# Patient Record
Sex: FEMALE | Race: WHITE | Employment: OTHER | URBAN - METROPOLITAN AREA
[De-identification: names, ages, dates, MRNs, and addresses within clinical notes are randomized per-mention and may not be internally consistent; named-entity substitution may affect disease eponyms.]

---

## 2017-01-17 ENCOUNTER — NURSING HOME VISIT (OUTPATIENT)
Dept: GERIATRICS | Facility: CLINIC | Age: 82
End: 2017-01-17
Payer: COMMERCIAL

## 2017-01-17 DIAGNOSIS — F02.818 DEMENTIA IN CONDITIONS CLASSIFIED ELSEWHERE WITH AGGRESSIVE BEHAVIOR (H): Primary | ICD-10-CM

## 2017-01-17 DIAGNOSIS — F41.9 ANXIETY: ICD-10-CM

## 2017-01-17 DIAGNOSIS — M17.0 PRIMARY OSTEOARTHRITIS OF BOTH KNEES: ICD-10-CM

## 2017-01-17 PROCEDURE — 99207 ZZC CDG-CORRECTLY CODED, REVIEWED AND AGREE: CPT | Performed by: NURSE PRACTITIONER

## 2017-01-17 PROCEDURE — 99308 SBSQ NF CARE LOW MDM 20: CPT | Performed by: NURSE PRACTITIONER
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
## 2017-01-19 VITALS
BODY MASS INDEX: 33.92 KG/M2 | HEIGHT: 65 IN | DIASTOLIC BLOOD PRESSURE: 64 MMHG | WEIGHT: 203.6 LBS | OXYGEN SATURATION: 90 % | RESPIRATION RATE: 16 BRPM | HEART RATE: 67 BPM | TEMPERATURE: 97.2 F | SYSTOLIC BLOOD PRESSURE: 129 MMHG

## 2017-02-21 ENCOUNTER — NURSING HOME VISIT (OUTPATIENT)
Dept: GERIATRICS | Facility: CLINIC | Age: 82
End: 2017-02-21
Payer: COMMERCIAL

## 2017-02-21 VITALS
TEMPERATURE: 98.7 F | OXYGEN SATURATION: 90 % | HEART RATE: 73 BPM | RESPIRATION RATE: 18 BRPM | BODY MASS INDEX: 34 KG/M2 | SYSTOLIC BLOOD PRESSURE: 137 MMHG | DIASTOLIC BLOOD PRESSURE: 57 MMHG | WEIGHT: 204.3 LBS

## 2017-02-21 DIAGNOSIS — I10 HYPERTENSION, BENIGN ESSENTIAL, GOAL BELOW 140/90: Primary | ICD-10-CM

## 2017-02-21 DIAGNOSIS — E03.4 HYPOTHYROIDISM DUE TO ACQUIRED ATROPHY OF THYROID: ICD-10-CM

## 2017-02-21 DIAGNOSIS — F02.818 DEMENTIA IN CONDITIONS CLASSIFIED ELSEWHERE WITH AGGRESSIVE BEHAVIOR (H): ICD-10-CM

## 2017-02-21 PROCEDURE — 99308 SBSQ NF CARE LOW MDM 20: CPT | Performed by: NURSE PRACTITIONER
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
## 2017-03-07 ENCOUNTER — NURSING HOME VISIT (OUTPATIENT)
Dept: GERIATRICS | Facility: CLINIC | Age: 82
End: 2017-03-07
Payer: COMMERCIAL

## 2017-03-07 VITALS
WEIGHT: 203.6 LBS | SYSTOLIC BLOOD PRESSURE: 99 MMHG | OXYGEN SATURATION: 96 % | TEMPERATURE: 98.6 F | RESPIRATION RATE: 20 BRPM | DIASTOLIC BLOOD PRESSURE: 43 MMHG | HEART RATE: 70 BPM | BODY MASS INDEX: 33.88 KG/M2

## 2017-03-07 DIAGNOSIS — M17.0 PRIMARY OSTEOARTHRITIS OF BOTH KNEES: ICD-10-CM

## 2017-03-07 DIAGNOSIS — I10 HYPERTENSION, BENIGN ESSENTIAL, GOAL BELOW 140/90: Primary | ICD-10-CM

## 2017-03-07 DIAGNOSIS — R51.9 HEADACHE, UNSPECIFIED HEADACHE TYPE: ICD-10-CM

## 2017-03-07 PROCEDURE — 99308 SBSQ NF CARE LOW MDM 20: CPT | Performed by: NURSE PRACTITIONER

## 2017-03-07 PROCEDURE — 99207 ZZC CDG-CORRECTLY CODED, REVIEWED AND AGREE: CPT | Performed by: NURSE PRACTITIONER
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
## 2017-03-16 ENCOUNTER — HOSPITAL LABORATORY (OUTPATIENT)
Facility: OTHER | Age: 82
End: 2017-03-16

## 2017-03-16 LAB — TSH SERPL DL<=0.05 MIU/L-ACNC: 4.24 MU/L (ref 0.4–4)

## 2017-04-11 VITALS
BODY MASS INDEX: 33.98 KG/M2 | DIASTOLIC BLOOD PRESSURE: 95 MMHG | TEMPERATURE: 98.9 F | HEART RATE: 85 BPM | OXYGEN SATURATION: 94 % | WEIGHT: 204.2 LBS | SYSTOLIC BLOOD PRESSURE: 140 MMHG

## 2017-04-11 NOTE — PROGRESS NOTES
[facility]    Chief Complaint   Patient presents with     halfway Regulatory       HPI:  Obtained from the medical record and from the medial staffs  Nguyen Borja is a 83 year old  (1/11/1934), who is being seen today for a federally mandated E/M visit at [facility] [place]. Today's concerns are:     Hypertension, benign essential, goal below 140/90  - no chest discomfort or SOB noted by nursing staff.      Hypothyroidism due to acquired atrophy of thyroid  - Non verbal, bed ridden, mood flat.    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  - Bedridden and non verbal.  - Severe, and is unable to make needs known. 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 On norco scheduled and prn.         Past Medical, social, family histories, medications, and allergies reviewed and updated    MEDICATIONS:  Current Outpatient Prescriptions   Medication Sig Dispense Refill     Acetaminophen (TYLENOL PO) Take 650 mg by mouth every 4 hours as needed for mild pain or fever       chlorhexidine (PERIDEX) 0.12 % solution Take by mouth 2 times daily One oral strip by mouth two times a day for gingivitis.  Swab mouth/gums BID after oral cares.       nystatin (MYCOSTATIN) 144086 UNIT/GM POWD Apply topically daily as needed       levothyroxine (SYNTHROID) 75 MCG tablet Take 75 mcg by mouth every morning       HYDROcodone-acetaminophen (NORCO) 5-325 MG per tablet Take 1 tablet by mouth every 8 hours And 1 tablet every 4hours PRN       Medications reviewed: Reviewed in the chart and EHR.        Case Management:  I have reviewed the care plan and MDS and do agree with the plan. Patient's desire to return to the community is not present.  Information reviewed:  Medications, vital signs, orders, and nursing  notes.    ROS:  Unobtainable secondary to cognitive impairment or aphasia.    Exam:  BP (!) 140/95  Pulse 85  Temp 98.9  F (37.2  C)  Wt 204 lb 3.2 oz (92.6 kg)  SpO2 94%  BMI 33.98 kg/m2  GENERAL APPEARANCE:  opens eye for verbal stimuli,   ENT:  Mouth normal, dry oral mucous membranes  EYES:  EOM, conjunctivae, lids, pupils and irises normal  NECK:  No adenopathy,masses or thyromegaly  RESP:  respiratory effort and palpation of chest normal, lungs clear to auscultation   CV:  Palpation and auscultation of heart done , regular rate and rhythm, no murmur, rub, or gallop, no edema  ABDOMEN:  normal bowel sounds, soft, nontender, no hepatosplenomegaly or other masses  M/S:   bed bound, extremities contractures. Legs with AFO brace.   SKIN:  Inspection of skin and subcutaneous tissue baseline  NEURO:   non verbal. Moves eyes.   PSYCH:  tired looking. no communications    Lab/Diagnostic data:    Hospital Laboratory on 03/16/2017   Component Date Value Ref Range Status     TSH 03/16/2017 4.24* 0.40 - 4.00 mU/L Final         ASSESSMENT/PLAN  Hypertension, benign essential, goal below 140/90   BP Readings from Last 3 Encounters:   04/11/17 (!) 140/95   03/07/17 99/43   02/21/17 137/57   - not on meds.     Hypothyroidism due to acquired atrophy of thyroid  Not on meds. Stable.     Frailty  - Significant  Deficits requiring NH placement. Requiring extensive assistance from nursing. Up for meals only o/w spends the day resting in bed      Generalized pain  - continue norco.     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 Chronic condition, ongoing decline expected.   -  Continue to provide redirection and reassurance as needed. Maintain safe living situation with goals focused on comfort.        Orders:  - The current medical regimen is effective;  continue present plan and medications.      Total time spent with patient visit was 35 min including patient visit  and review of past records.  .    Electronically signed by:  Jhony Shields MD

## 2017-04-12 ENCOUNTER — NURSING HOME VISIT (OUTPATIENT)
Dept: GERIATRICS | Facility: CLINIC | Age: 82
End: 2017-04-12
Payer: COMMERCIAL

## 2017-04-12 DIAGNOSIS — R52 GENERALIZED PAIN: ICD-10-CM

## 2017-04-12 DIAGNOSIS — I10 HYPERTENSION, BENIGN ESSENTIAL, GOAL BELOW 140/90: Primary | ICD-10-CM

## 2017-04-12 DIAGNOSIS — F02.818 DEMENTIA IN CONDITIONS CLASSIFIED ELSEWHERE WITH AGGRESSIVE BEHAVIOR (H): ICD-10-CM

## 2017-04-12 DIAGNOSIS — R54 FRAILTY: ICD-10-CM

## 2017-04-12 DIAGNOSIS — E03.4 HYPOTHYROIDISM DUE TO ACQUIRED ATROPHY OF THYROID: ICD-10-CM

## 2017-04-12 PROCEDURE — 99310 SBSQ NF CARE HIGH MDM 45: CPT | Performed by: FAMILY MEDICINE

## 2017-04-12 PROCEDURE — 99207 ZZC CDG-CORRECTLY CODED, REVIEWED AND AGREE: CPT | Performed by: FAMILY MEDICINE

## 2017-05-09 VITALS
HEART RATE: 79 BPM | OXYGEN SATURATION: 94 % | WEIGHT: 203.8 LBS | DIASTOLIC BLOOD PRESSURE: 68 MMHG | TEMPERATURE: 96.6 F | BODY MASS INDEX: 33.91 KG/M2 | SYSTOLIC BLOOD PRESSURE: 136 MMHG

## 2017-05-09 NOTE — PROGRESS NOTES
[facility]    Chief Complaint   Patient presents with     Nursing Home Acute       HPI:    Nguyen Borja is a 83 year old  (1/11/1934), who is being seen today for an episodic care visit at [facility] [place]. Today's concern is:  Late onset Alzheimer's disease without behavioral disturbance  Unable to speak, advanced dementia    Hypothyroidism due to acquired atrophy of thyroid  No new symptoms.     Generalized pain  No observable pain.       ALLERGIES: Review of patient's allergies indicates no known allergies.  Past Medical, Surgical, Family and Social History reviewed and updated in [facility].    Current Outpatient Prescriptions   Medication Sig Dispense Refill     Acetaminophen (TYLENOL PO) Take 650 mg by mouth every 4 hours as needed for mild pain or fever       chlorhexidine (PERIDEX) 0.12 % solution Take by mouth 2 times daily One oral strip by mouth two times a day for gingivitis.  Swab mouth/gums BID after oral cares.       nystatin (MYCOSTATIN) 750930 UNIT/GM POWD Apply topically daily as needed       levothyroxine (SYNTHROID) 75 MCG tablet Take 75 mcg by mouth every morning       HYDROcodone-acetaminophen (NORCO) 5-325 MG per tablet Take 1 tablet by mouth every 8 hours And 1 tablet every 4hours PRN         Medications reconciled to facility chart and changes were made to reflect current medications as identified as above med list. Below are the changes that were made:   Medications stopped since last EPIC medication reconciliation:   There are no discontinued medications.    Medications started since last [facility] medication reconciliation:  No orders of the defined types were placed in this encounter.    REVIEW OF SYSTEMS:  Unobtainable secondary to cognitive impairment or aphasia.    PHYSICAL EXAM:  /68  Pulse 79  Temp 96.6  F (35.9  C)  Wt 203 lb 12.8 oz (92.4 kg)  SpO2 94%  BMI 33.91 kg/m2  GENERAL APPEARANCE:  Alert, in no distress  HEAD:  Normal, normocephalic,  atraumatic  CHEST/RESP:  respiratory effort normal, lung sounds CTA , no respiratory distress  CV:  Rate reg, rhythm reg, no murmur, no peripheral edema   PSYCH:  Alert and unable to determine orientation due to cognitive impairment     RECENT LABS:    Hospital Laboratory on 03/16/2017   Component Date Value Ref Range Status     TSH 03/16/2017 4.24* 0.40 - 4.00 mU/L Final       The health plan new enrollment has happened. I have reviewed the  MDS, the preventative needs,  and facility care plan. The level of care is appropriate. I have reviewed the code status/advanced directives.      ASSESSMENT/PLAN:  Late onset Alzheimer's disease without behavioral disturbance  Chronic condition, ongoing decline expected. Maintain safe living situation with goals focused on comfort.      Hypothyroidism due to acquired atrophy of thyroid  TSH normal. The current medical regimen is effective;  continue present plan and medications.      Generalized pain  Controlled, stable. The current medical regimen is effective;  continue present plan and medications.          The health plan new enrollment has happened. I have reviewed the  MDS, the preventative needs,  and facility care plan. The level of care is appropriate. I have reviewed the code status/advanced directives.       ORDERS:  No new orders     Total time spent with patient visit was 15 min including patient visit and review of past records   Greater than 50% of total time spent with counseling and coordinating care    Electronically signed by  Jennifer Serrano CNP   [facility]  [phone] cell

## 2017-05-10 ENCOUNTER — NURSING HOME VISIT (OUTPATIENT)
Dept: GERIATRICS | Facility: CLINIC | Age: 82
End: 2017-05-10
Payer: COMMERCIAL

## 2017-05-10 DIAGNOSIS — F02.80 LATE ONSET ALZHEIMER'S DISEASE WITHOUT BEHAVIORAL DISTURBANCE (H): Primary | ICD-10-CM

## 2017-05-10 DIAGNOSIS — E03.4 HYPOTHYROIDISM DUE TO ACQUIRED ATROPHY OF THYROID: ICD-10-CM

## 2017-05-10 DIAGNOSIS — G30.1 LATE ONSET ALZHEIMER'S DISEASE WITHOUT BEHAVIORAL DISTURBANCE (H): Primary | ICD-10-CM

## 2017-05-10 DIAGNOSIS — R52 GENERALIZED PAIN: ICD-10-CM

## 2017-05-10 PROCEDURE — 99308 SBSQ NF CARE LOW MDM 20: CPT | Performed by: NURSE PRACTITIONER

## 2017-05-10 PROCEDURE — 99207 ZZC CDG-CORRECTLY CODED, REVIEWED AND AGREE: CPT | Performed by: NURSE PRACTITIONER

## 2017-06-07 ENCOUNTER — NURSING HOME VISIT (OUTPATIENT)
Dept: GERIATRICS | Facility: CLINIC | Age: 82
End: 2017-06-07
Payer: COMMERCIAL

## 2017-06-07 VITALS
WEIGHT: 204 LBS | RESPIRATION RATE: 18 BRPM | TEMPERATURE: 98.2 F | OXYGEN SATURATION: 92 % | SYSTOLIC BLOOD PRESSURE: 128 MMHG | BODY MASS INDEX: 33.95 KG/M2 | DIASTOLIC BLOOD PRESSURE: 58 MMHG | HEART RATE: 70 BPM

## 2017-06-07 DIAGNOSIS — E03.4 HYPOTHYROIDISM DUE TO ACQUIRED ATROPHY OF THYROID: ICD-10-CM

## 2017-06-07 DIAGNOSIS — R52 GENERALIZED PAIN: ICD-10-CM

## 2017-06-07 DIAGNOSIS — G30.1 LATE ONSET ALZHEIMER'S DISEASE WITHOUT BEHAVIORAL DISTURBANCE (H): Primary | ICD-10-CM

## 2017-06-07 DIAGNOSIS — F02.80 LATE ONSET ALZHEIMER'S DISEASE WITHOUT BEHAVIORAL DISTURBANCE (H): Primary | ICD-10-CM

## 2017-06-07 DIAGNOSIS — I10 HYPERTENSION, BENIGN ESSENTIAL, GOAL BELOW 140/90: ICD-10-CM

## 2017-06-07 PROCEDURE — 99308 SBSQ NF CARE LOW MDM 20: CPT | Performed by: NURSE PRACTITIONER

## 2017-06-07 PROCEDURE — 99207 ZZC CDG-CORRECTLY CODED, REVIEWED AND AGREE: CPT | Performed by: NURSE PRACTITIONER

## 2017-06-07 NOTE — PROGRESS NOTES
[facility]    Chief Complaint   Patient presents with     custodial Regulatory       HPI:    Nguyen Borja is a 83 year old  (1/11/1934), who is being seen today for a federally mandated E/M visit at [facility].  HPI information obtained from: facility chart records and facility staff. Today's concerns are:  Late onset Alzheimer's disease without behavioral disturbance  Advanced dementia.  No reported changes.  Unable to speak    Hypothyroidism due to acquired atrophy of thyroid  Last TSH WNL. No new S/S    Hypertension, benign essential, goal below 140/90  One reading over 140/90.  All others WNL    Generalized pain  No reported or observable pain      ALLERGIES: Review of patient's allergies indicates no known allergies.  New new History to report    Current Outpatient Prescriptions   Medication Sig Dispense Refill     chlorhexidine (PERIDEX) 0.12 % solution Take by mouth 2 times daily One oral strip by mouth two times a day for gingivitis.  Swab mouth/gums BID after oral cares.       nystatin (MYCOSTATIN) 308497 UNIT/GM POWD Apply topically daily as needed       levothyroxine (SYNTHROID) 75 MCG tablet Take 75 mcg by mouth every morning       HYDROcodone-acetaminophen (NORCO) 5-325 MG per tablet Take 1 tablet by mouth every 8 hours And 1 tablet every 4hours PRN       Medications reviewed:  Medications reconciled to facility chart and changes were made to reflect current medications as identified as above med list. Below are the changes that were made:   Medications stopped since last EPIC medication reconciliation:   Medications Discontinued During This Encounter   Medication Reason     Acetaminophen (TYLENOL PO)        Medications started since last [facility] medication reconciliation:  No orders of the defined types were placed in this encounter.        Case Management:  I have reviewed the care plan and MDS and do agree with the plan. Patient's desire to return to the community  is not present.  Information reviewed:  Medications, vital signs, orders, and nursing notes.    ROS:  Unobtainable secondary to cognitive impairment or aphasia.    Exam:  Vitals: /58  Pulse 70  Temp 98.2  F (36.8  C)  Resp 18  Wt 204 lb (92.5 kg)  SpO2 92%  BMI 33.95 kg/m2  BMI= Body mass index is 33.95 kg/(m^2).    GENERAL APPEARANCE:  Semi-Alert, makes eye contact, no intelligible words, in no apparent distress  Oral: gums red,not tender to touch  RESP:  respiratory effort and palpation of chest normal, auscultation of lungs CTA , no respiratory distress  CV:  Palpation and auscultation of heart done , rate and rhythm regualr, no peripheral edema  ABDOMEN:  normal bowel sounds, soft, nontender,   SKIN:  Mild redness under bilateral breasts   PSYCH:  Unable to determine orientation due to cognitive impairment      Lab/Diagnostic data:    Results for orders placed or performed in visit on 03/16/17   TSH   Result Value Ref Range    TSH 4.24 (H) 0.40 - 4.00 mU/L         ASSESSMENT/PLAN  Late onset Alzheimer's disease without behavioral disturbance  Chronic, ongoing decline expected.  Continue current POC.  Focus is comfort    Hypothyroidism due to acquired atrophy of thyroid  TSH normal.  Continue current medical regimen    Hypertension, benign essential, goal below 140/90  Stable.  Continue current POC    Generalized pain  Controlled,The current medical regimen is effective;  continue present plan and medications.        Total time spent with patient visit at the skilled nursing facility was 15 min including patient visit and review of past records. Greater than 50% of total time spent with counseling and coordinating care due to x alzheimer's    Electronically signed by:  TONE Worthy CNP

## 2017-06-26 ENCOUNTER — HOSPITAL ENCOUNTER (INPATIENT)
Facility: CLINIC | Age: 82
LOS: 2 days | Discharge: HOSPICE | DRG: 871 | End: 2017-06-28
Attending: STUDENT IN AN ORGANIZED HEALTH CARE EDUCATION/TRAINING PROGRAM | Admitting: INTERNAL MEDICINE
Payer: COMMERCIAL

## 2017-06-26 ENCOUNTER — MEDICAL CORRESPONDENCE (OUTPATIENT)
Dept: HEALTH INFORMATION MANAGEMENT | Facility: CLINIC | Age: 82
End: 2017-06-26

## 2017-06-26 ENCOUNTER — APPOINTMENT (OUTPATIENT)
Dept: GENERAL RADIOLOGY | Facility: CLINIC | Age: 82
DRG: 871 | End: 2017-06-26
Attending: STUDENT IN AN ORGANIZED HEALTH CARE EDUCATION/TRAINING PROGRAM
Payer: COMMERCIAL

## 2017-06-26 DIAGNOSIS — N39.0 URINARY TRACT INFECTION, SITE UNSPECIFIED: ICD-10-CM

## 2017-06-26 DIAGNOSIS — J18.9 CAP (COMMUNITY ACQUIRED PNEUMONIA): Primary | ICD-10-CM

## 2017-06-26 DIAGNOSIS — J18.9 PNEUMONIA OF LEFT LOWER LOBE DUE TO INFECTIOUS ORGANISM: ICD-10-CM

## 2017-06-26 DIAGNOSIS — J96.01 ACUTE RESPIRATORY FAILURE WITH HYPOXIA (H): ICD-10-CM

## 2017-06-26 DIAGNOSIS — R41.82 ALTERED MENTAL STATUS, UNSPECIFIED ALTERED MENTAL STATUS TYPE: ICD-10-CM

## 2017-06-26 PROBLEM — A41.9 SEPSIS (H): Status: ACTIVE | Noted: 2017-06-26

## 2017-06-26 PROBLEM — R79.89 ELEVATED LACTIC ACID LEVEL: Status: ACTIVE | Noted: 2017-06-26

## 2017-06-26 LAB
ALBUMIN SERPL-MCNC: 3.6 G/DL (ref 3.4–5)
ALBUMIN UR-MCNC: 10 MG/DL
ALP SERPL-CCNC: 80 U/L (ref 40–150)
ALT SERPL W P-5'-P-CCNC: 31 U/L (ref 0–50)
ANION GAP SERPL CALCULATED.3IONS-SCNC: 12 MMOL/L (ref 3–14)
APPEARANCE UR: ABNORMAL
AST SERPL W P-5'-P-CCNC: 22 U/L (ref 0–45)
BASE DEFICIT BLDV-SCNC: 2.2 MMOL/L
BASOPHILS # BLD AUTO: 0 10E9/L (ref 0–0.2)
BASOPHILS NFR BLD AUTO: 0.2 %
BILIRUB SERPL-MCNC: 0.9 MG/DL (ref 0.2–1.3)
BILIRUB UR QL STRIP: NEGATIVE
BUN SERPL-MCNC: 15 MG/DL (ref 7–30)
CALCIUM SERPL-MCNC: 9.2 MG/DL (ref 8.5–10.1)
CHLORIDE SERPL-SCNC: 108 MMOL/L (ref 94–109)
CO2 SERPL-SCNC: 21 MMOL/L (ref 20–32)
COLOR UR AUTO: YELLOW
CREAT SERPL-MCNC: 0.89 MG/DL (ref 0.52–1.04)
DIFFERENTIAL METHOD BLD: ABNORMAL
EOSINOPHIL # BLD AUTO: 0 10E9/L (ref 0–0.7)
EOSINOPHIL NFR BLD AUTO: 0.1 %
ERYTHROCYTE [DISTWIDTH] IN BLOOD BY AUTOMATED COUNT: 17.2 % (ref 10–15)
GFR SERPL CREATININE-BSD FRML MDRD: 61 ML/MIN/1.7M2
GLUCOSE BLDC GLUCOMTR-MCNC: 126 MG/DL (ref 70–99)
GLUCOSE BLDC GLUCOMTR-MCNC: 144 MG/DL (ref 70–99)
GLUCOSE BLDC GLUCOMTR-MCNC: 163 MG/DL (ref 70–99)
GLUCOSE BLDC GLUCOMTR-MCNC: 187 MG/DL (ref 70–99)
GLUCOSE SERPL-MCNC: 157 MG/DL (ref 70–99)
GLUCOSE UR STRIP-MCNC: NEGATIVE MG/DL
HBA1C MFR BLD: 6.6 % (ref 4.3–6)
HCO3 BLDV-SCNC: 24 MMOL/L (ref 21–28)
HCT VFR BLD AUTO: 44.3 % (ref 35–47)
HGB BLD-MCNC: 13.7 G/DL (ref 11.7–15.7)
HGB UR QL STRIP: ABNORMAL
IMM GRANULOCYTES # BLD: 0.1 10E9/L (ref 0–0.4)
IMM GRANULOCYTES NFR BLD: 0.6 %
INR PPP: 0.98 (ref 0.86–1.14)
KETONES UR STRIP-MCNC: 5 MG/DL
LACTATE BLD-SCNC: 5.1 MMOL/L (ref 0.7–2.1)
LACTATE BLD-SCNC: 5.2 MMOL/L (ref 0.7–2.1)
LEUKOCYTE ESTERASE UR QL STRIP: ABNORMAL
LYMPHOCYTES # BLD AUTO: 0.7 10E9/L (ref 0.8–5.3)
LYMPHOCYTES NFR BLD AUTO: 4.1 %
MCH RBC QN AUTO: 26.3 PG (ref 26.5–33)
MCHC RBC AUTO-ENTMCNC: 30.9 G/DL (ref 31.5–36.5)
MCV RBC AUTO: 85 FL (ref 78–100)
MONOCYTES # BLD AUTO: 0.2 10E9/L (ref 0–1.3)
MONOCYTES NFR BLD AUTO: 1 %
MRSA DNA SPEC QL NAA+PROBE: NORMAL
MUCOUS THREADS #/AREA URNS LPF: PRESENT /LPF
NEUTROPHILS # BLD AUTO: 16.2 10E9/L (ref 1.6–8.3)
NEUTROPHILS NFR BLD AUTO: 94 %
NITRATE UR QL: POSITIVE
O2/TOTAL GAS SETTING VFR VENT: NORMAL %
PCO2 BLDV: 46 MM HG (ref 40–50)
PH BLDV: 7.32 PH (ref 7.32–7.43)
PH UR STRIP: 5.5 PH (ref 5–7)
PLATELET # BLD AUTO: 316 10E9/L (ref 150–450)
PO2 BLDV: 27 MM HG (ref 25–47)
POTASSIUM SERPL-SCNC: 4.2 MMOL/L (ref 3.4–5.3)
PROT SERPL-MCNC: 7.9 G/DL (ref 6.8–8.8)
RBC # BLD AUTO: 5.21 10E12/L (ref 3.8–5.2)
RBC #/AREA URNS AUTO: 31 /HPF (ref 0–2)
SODIUM SERPL-SCNC: 141 MMOL/L (ref 133–144)
SP GR UR STRIP: 1.01 (ref 1–1.03)
SPECIMEN SOURCE: NORMAL
SQUAMOUS #/AREA URNS AUTO: 5 /HPF (ref 0–1)
TROPONIN I SERPL-MCNC: 0.03 UG/L (ref 0–0.04)
URN SPEC COLLECT METH UR: ABNORMAL
UROBILINOGEN UR STRIP-MCNC: NORMAL MG/DL (ref 0–2)
WBC # BLD AUTO: 17.3 10E9/L (ref 4–11)
WBC #/AREA URNS AUTO: 53 /HPF (ref 0–2)
WBC CLUMPS #/AREA URNS HPF: PRESENT /HPF

## 2017-06-26 PROCEDURE — 83036 HEMOGLOBIN GLYCOSYLATED A1C: CPT | Performed by: PHYSICIAN ASSISTANT

## 2017-06-26 PROCEDURE — 40000281 ZZH STATISTIC TRANSPORT TIME EA 15 MIN

## 2017-06-26 PROCEDURE — 25000132 ZZH RX MED GY IP 250 OP 250 PS 637: Performed by: STUDENT IN AN ORGANIZED HEALTH CARE EDUCATION/TRAINING PROGRAM

## 2017-06-26 PROCEDURE — 87040 BLOOD CULTURE FOR BACTERIA: CPT | Performed by: STUDENT IN AN ORGANIZED HEALTH CARE EDUCATION/TRAINING PROGRAM

## 2017-06-26 PROCEDURE — 94640 AIRWAY INHALATION TREATMENT: CPT | Mod: 76

## 2017-06-26 PROCEDURE — 87640 STAPH A DNA AMP PROBE: CPT | Performed by: PHYSICIAN ASSISTANT

## 2017-06-26 PROCEDURE — 82803 BLOOD GASES ANY COMBINATION: CPT | Performed by: STUDENT IN AN ORGANIZED HEALTH CARE EDUCATION/TRAINING PROGRAM

## 2017-06-26 PROCEDURE — 99285 EMERGENCY DEPT VISIT HI MDM: CPT | Mod: 25

## 2017-06-26 PROCEDURE — 87086 URINE CULTURE/COLONY COUNT: CPT | Performed by: STUDENT IN AN ORGANIZED HEALTH CARE EDUCATION/TRAINING PROGRAM

## 2017-06-26 PROCEDURE — 36415 COLL VENOUS BLD VENIPUNCTURE: CPT | Performed by: PHYSICIAN ASSISTANT

## 2017-06-26 PROCEDURE — 87186 SC STD MICRODIL/AGAR DIL: CPT | Performed by: STUDENT IN AN ORGANIZED HEALTH CARE EDUCATION/TRAINING PROGRAM

## 2017-06-26 PROCEDURE — 20000003 ZZH R&B ICU

## 2017-06-26 PROCEDURE — 87641 MR-STAPH DNA AMP PROBE: CPT | Performed by: PHYSICIAN ASSISTANT

## 2017-06-26 PROCEDURE — 96365 THER/PROPH/DIAG IV INF INIT: CPT

## 2017-06-26 PROCEDURE — 36415 COLL VENOUS BLD VENIPUNCTURE: CPT

## 2017-06-26 PROCEDURE — 51702 INSERT TEMP BLADDER CATH: CPT

## 2017-06-26 PROCEDURE — 25000128 H RX IP 250 OP 636: Performed by: PHYSICIAN ASSISTANT

## 2017-06-26 PROCEDURE — 99291 CRITICAL CARE FIRST HOUR: CPT | Performed by: STUDENT IN AN ORGANIZED HEALTH CARE EDUCATION/TRAINING PROGRAM

## 2017-06-26 PROCEDURE — 40000270 ZZH STATISTIC OXYGEN  O2DAILY TECH TIME

## 2017-06-26 PROCEDURE — 00000146 ZZHCL STATISTIC GLUCOSE BY METER IP

## 2017-06-26 PROCEDURE — 96367 TX/PROPH/DG ADDL SEQ IV INF: CPT

## 2017-06-26 PROCEDURE — 81001 URINALYSIS AUTO W/SCOPE: CPT | Performed by: STUDENT IN AN ORGANIZED HEALTH CARE EDUCATION/TRAINING PROGRAM

## 2017-06-26 PROCEDURE — 84484 ASSAY OF TROPONIN QUANT: CPT | Performed by: STUDENT IN AN ORGANIZED HEALTH CARE EDUCATION/TRAINING PROGRAM

## 2017-06-26 PROCEDURE — 87088 URINE BACTERIA CULTURE: CPT | Performed by: STUDENT IN AN ORGANIZED HEALTH CARE EDUCATION/TRAINING PROGRAM

## 2017-06-26 PROCEDURE — 40000275 ZZH STATISTIC RCP TIME EA 10 MIN

## 2017-06-26 PROCEDURE — 83605 ASSAY OF LACTIC ACID: CPT | Performed by: PHYSICIAN ASSISTANT

## 2017-06-26 PROCEDURE — 25000128 H RX IP 250 OP 636: Performed by: STUDENT IN AN ORGANIZED HEALTH CARE EDUCATION/TRAINING PROGRAM

## 2017-06-26 PROCEDURE — 85610 PROTHROMBIN TIME: CPT | Performed by: STUDENT IN AN ORGANIZED HEALTH CARE EDUCATION/TRAINING PROGRAM

## 2017-06-26 PROCEDURE — 96366 THER/PROPH/DIAG IV INF ADDON: CPT

## 2017-06-26 PROCEDURE — 83605 ASSAY OF LACTIC ACID: CPT | Performed by: STUDENT IN AN ORGANIZED HEALTH CARE EDUCATION/TRAINING PROGRAM

## 2017-06-26 PROCEDURE — 25000132 ZZH RX MED GY IP 250 OP 250 PS 637: Performed by: PHYSICIAN ASSISTANT

## 2017-06-26 PROCEDURE — 71010 XR CHEST PORT 1 VW: CPT

## 2017-06-26 PROCEDURE — 85025 COMPLETE CBC W/AUTO DIFF WBC: CPT | Performed by: STUDENT IN AN ORGANIZED HEALTH CARE EDUCATION/TRAINING PROGRAM

## 2017-06-26 PROCEDURE — 96361 HYDRATE IV INFUSION ADD-ON: CPT

## 2017-06-26 PROCEDURE — 25000125 ZZHC RX 250: Performed by: STUDENT IN AN ORGANIZED HEALTH CARE EDUCATION/TRAINING PROGRAM

## 2017-06-26 PROCEDURE — 87800 DETECT AGNT MULT DNA DIREC: CPT | Performed by: STUDENT IN AN ORGANIZED HEALTH CARE EDUCATION/TRAINING PROGRAM

## 2017-06-26 PROCEDURE — 80053 COMPREHEN METABOLIC PANEL: CPT | Performed by: STUDENT IN AN ORGANIZED HEALTH CARE EDUCATION/TRAINING PROGRAM

## 2017-06-26 PROCEDURE — 25000125 ZZHC RX 250: Performed by: PHYSICIAN ASSISTANT

## 2017-06-26 PROCEDURE — 99223 1ST HOSP IP/OBS HIGH 75: CPT | Mod: AI | Performed by: PHYSICIAN ASSISTANT

## 2017-06-26 PROCEDURE — 94640 AIRWAY INHALATION TREATMENT: CPT

## 2017-06-26 PROCEDURE — 87077 CULTURE AEROBIC IDENTIFY: CPT | Performed by: STUDENT IN AN ORGANIZED HEALTH CARE EDUCATION/TRAINING PROGRAM

## 2017-06-26 RX ORDER — SODIUM CHLORIDE 9 MG/ML
INJECTION, SOLUTION INTRAVENOUS CONTINUOUS
Status: ACTIVE | OUTPATIENT
Start: 2017-06-26 | End: 2017-06-26

## 2017-06-26 RX ORDER — IPRATROPIUM BROMIDE AND ALBUTEROL SULFATE 2.5; .5 MG/3ML; MG/3ML
3 SOLUTION RESPIRATORY (INHALATION) ONCE
Status: COMPLETED | OUTPATIENT
Start: 2017-06-26 | End: 2017-06-26

## 2017-06-26 RX ORDER — NYSTATIN 100000 [USP'U]/G
POWDER TOPICAL DAILY PRN
Status: DISCONTINUED | OUTPATIENT
Start: 2017-06-26 | End: 2017-06-26 | Stop reason: CLARIF

## 2017-06-26 RX ORDER — PROCHLORPERAZINE 25 MG
12.5 SUPPOSITORY, RECTAL RECTAL EVERY 12 HOURS PRN
Status: DISCONTINUED | OUTPATIENT
Start: 2017-06-26 | End: 2017-06-28 | Stop reason: HOSPADM

## 2017-06-26 RX ORDER — CEFTRIAXONE 2 G/1
2 INJECTION, POWDER, FOR SOLUTION INTRAMUSCULAR; INTRAVENOUS EVERY 24 HOURS
Status: DISCONTINUED | OUTPATIENT
Start: 2017-06-27 | End: 2017-06-26

## 2017-06-26 RX ORDER — ONDANSETRON 2 MG/ML
4 INJECTION INTRAMUSCULAR; INTRAVENOUS EVERY 6 HOURS PRN
Status: DISCONTINUED | OUTPATIENT
Start: 2017-06-26 | End: 2017-06-28 | Stop reason: HOSPADM

## 2017-06-26 RX ORDER — NICOTINE POLACRILEX 4 MG
15-30 LOZENGE BUCCAL
Status: DISCONTINUED | OUTPATIENT
Start: 2017-06-26 | End: 2017-06-28 | Stop reason: HOSPADM

## 2017-06-26 RX ORDER — NALOXONE HYDROCHLORIDE 0.4 MG/ML
.1-.4 INJECTION, SOLUTION INTRAMUSCULAR; INTRAVENOUS; SUBCUTANEOUS
Status: CANCELLED | OUTPATIENT
Start: 2017-06-26

## 2017-06-26 RX ORDER — PROCHLORPERAZINE MALEATE 5 MG
5 TABLET ORAL EVERY 6 HOURS PRN
Status: DISCONTINUED | OUTPATIENT
Start: 2017-06-26 | End: 2017-06-28 | Stop reason: HOSPADM

## 2017-06-26 RX ORDER — NALOXONE HYDROCHLORIDE 0.4 MG/ML
.1-.4 INJECTION, SOLUTION INTRAMUSCULAR; INTRAVENOUS; SUBCUTANEOUS
Status: DISCONTINUED | OUTPATIENT
Start: 2017-06-26 | End: 2017-06-28 | Stop reason: HOSPADM

## 2017-06-26 RX ORDER — SODIUM CHLORIDE, SODIUM LACTATE, POTASSIUM CHLORIDE, CALCIUM CHLORIDE 600; 310; 30; 20 MG/100ML; MG/100ML; MG/100ML; MG/100ML
INJECTION, SOLUTION INTRAVENOUS CONTINUOUS
Status: DISCONTINUED | OUTPATIENT
Start: 2017-06-26 | End: 2017-06-26

## 2017-06-26 RX ORDER — LEVOTHYROXINE SODIUM 75 UG/1
75 TABLET ORAL
Status: DISCONTINUED | OUTPATIENT
Start: 2017-06-26 | End: 2017-06-28 | Stop reason: HOSPADM

## 2017-06-26 RX ORDER — CEFTRIAXONE 1 G/1
1 INJECTION, POWDER, FOR SOLUTION INTRAMUSCULAR; INTRAVENOUS ONCE
Status: COMPLETED | OUTPATIENT
Start: 2017-06-26 | End: 2017-06-26

## 2017-06-26 RX ORDER — ACETAMINOPHEN 650 MG/1
650 SUPPOSITORY RECTAL EVERY 4 HOURS PRN
Status: DISCONTINUED | OUTPATIENT
Start: 2017-06-26 | End: 2017-06-28 | Stop reason: HOSPADM

## 2017-06-26 RX ORDER — ONDANSETRON 4 MG/1
4 TABLET, ORALLY DISINTEGRATING ORAL EVERY 6 HOURS PRN
Status: DISCONTINUED | OUTPATIENT
Start: 2017-06-26 | End: 2017-06-28 | Stop reason: HOSPADM

## 2017-06-26 RX ORDER — DEXTROSE MONOHYDRATE 25 G/50ML
25-50 INJECTION, SOLUTION INTRAVENOUS
Status: DISCONTINUED | OUTPATIENT
Start: 2017-06-26 | End: 2017-06-28 | Stop reason: HOSPADM

## 2017-06-26 RX ORDER — ACETAMINOPHEN 650 MG/1
650 SUPPOSITORY RECTAL ONCE
Status: COMPLETED | OUTPATIENT
Start: 2017-06-26 | End: 2017-06-26

## 2017-06-26 RX ORDER — IPRATROPIUM BROMIDE AND ALBUTEROL SULFATE 2.5; .5 MG/3ML; MG/3ML
3 SOLUTION RESPIRATORY (INHALATION)
Status: DISCONTINUED | OUTPATIENT
Start: 2017-06-26 | End: 2017-06-28 | Stop reason: HOSPADM

## 2017-06-26 RX ORDER — IPRATROPIUM BROMIDE AND ALBUTEROL SULFATE 2.5; .5 MG/3ML; MG/3ML
3 SOLUTION RESPIRATORY (INHALATION)
Status: DISCONTINUED | OUTPATIENT
Start: 2017-06-26 | End: 2017-06-26

## 2017-06-26 RX ORDER — AZITHROMYCIN 250 MG/1
250 TABLET, FILM COATED ORAL EVERY 24 HOURS
Status: DISCONTINUED | OUTPATIENT
Start: 2017-06-27 | End: 2017-06-28 | Stop reason: HOSPADM

## 2017-06-26 RX ADMIN — CEFTRIAXONE 1 G: 1 INJECTION, POWDER, FOR SOLUTION INTRAMUSCULAR; INTRAVENOUS at 06:11

## 2017-06-26 RX ADMIN — IPRATROPIUM BROMIDE AND ALBUTEROL SULFATE 3 ML: .5; 3 SOLUTION RESPIRATORY (INHALATION) at 04:24

## 2017-06-26 RX ADMIN — SODIUM CHLORIDE, POTASSIUM CHLORIDE, SODIUM LACTATE AND CALCIUM CHLORIDE: 600; 310; 30; 20 INJECTION, SOLUTION INTRAVENOUS at 06:43

## 2017-06-26 RX ADMIN — IPRATROPIUM BROMIDE AND ALBUTEROL SULFATE 3 ML: .5; 3 SOLUTION RESPIRATORY (INHALATION) at 17:23

## 2017-06-26 RX ADMIN — CEFTRIAXONE 1 G: 1 INJECTION, POWDER, FOR SOLUTION INTRAMUSCULAR; INTRAVENOUS at 04:59

## 2017-06-26 RX ADMIN — AZITHROMYCIN MONOHYDRATE 500 MG: 500 INJECTION, POWDER, LYOPHILIZED, FOR SOLUTION INTRAVENOUS at 06:42

## 2017-06-26 RX ADMIN — TAZOBACTAM SODIUM AND PIPERACILLIN SODIUM 3.38 G: 375; 3 INJECTION, SOLUTION INTRAVENOUS at 10:42

## 2017-06-26 RX ADMIN — ACETAMINOPHEN 650 MG: 650 SUPPOSITORY RECTAL at 04:46

## 2017-06-26 RX ADMIN — SODIUM CHLORIDE, POTASSIUM CHLORIDE, SODIUM LACTATE AND CALCIUM CHLORIDE 2000 ML: 600; 310; 30; 20 INJECTION, SOLUTION INTRAVENOUS at 04:46

## 2017-06-26 RX ADMIN — SODIUM CHLORIDE: 9 INJECTION, SOLUTION INTRAVENOUS at 08:29

## 2017-06-26 RX ADMIN — IPRATROPIUM BROMIDE AND ALBUTEROL SULFATE 3 ML: .5; 3 SOLUTION RESPIRATORY (INHALATION) at 09:08

## 2017-06-26 RX ADMIN — TAZOBACTAM SODIUM AND PIPERACILLIN SODIUM 3.38 G: 375; 3 INJECTION, SOLUTION INTRAVENOUS at 21:25

## 2017-06-26 RX ADMIN — TAZOBACTAM SODIUM AND PIPERACILLIN SODIUM 3.38 G: 375; 3 INJECTION, SOLUTION INTRAVENOUS at 16:52

## 2017-06-26 RX ADMIN — ENOXAPARIN SODIUM 40 MG: 40 INJECTION SUBCUTANEOUS at 09:56

## 2017-06-26 RX ADMIN — IPRATROPIUM BROMIDE AND ALBUTEROL SULFATE 3 ML: .5; 3 SOLUTION RESPIRATORY (INHALATION) at 23:13

## 2017-06-26 RX ADMIN — LEVOTHYROXINE SODIUM 75 MCG: 75 TABLET ORAL at 11:23

## 2017-06-26 RX ADMIN — IPRATROPIUM BROMIDE AND ALBUTEROL SULFATE 3 ML: .5; 3 SOLUTION RESPIRATORY (INHALATION) at 20:48

## 2017-06-26 RX ADMIN — IPRATROPIUM BROMIDE AND ALBUTEROL SULFATE 3 ML: .5; 3 SOLUTION RESPIRATORY (INHALATION) at 13:12

## 2017-06-26 ASSESSMENT — ACTIVITIES OF DAILY LIVING (ADL)
AMBULATION: 4-->COMPLETELY DEPENDENT
COMMUNICATION: 3-->UNABLE TO UNDERSTAND OR SPEAK (NOT RELATED TO LANGUAGE BARRIER)
TRANSFERRING: 4-->COMPLETELY DEPENDENT
RETIRED_EATING: 4-->COMPLETELY DEPENDENT
TOILETING: 4-->COMPLETELY DEPENDENT
CHANGE_IN_FUNCTIONAL_STATUS_SINCE_ONSET_OF_CURRENT_ILLNESS/INJURY: YES
EATING: 4-->COMPLETELY DEPENDENT
FALL_HISTORY_WITHIN_LAST_SIX_MONTHS: NO
RETIRED_COMMUNICATION: 3-->UNABLE TO UNDERSTAND OR SPEAK (NOT RELATED TO LANGUAGE BARRIER)
TRANSFERRING: 4-->COMPLETELY DEPENDENT
TOILETING: 4-->COMPLETELY DEPENDENT
SWALLOWING: 2-->DIFFICULTY SWALLOWING FOODS
DRESS: 4-->COMPLETELY DEPENDENT
CURRENT_FUNCTIONAL_LEVEL_COMMENT: ALTERED LEVEL OF CONSCIOUSNESS
AMBULATION: 4-->COMPLETELY DEPENDENT
DRESS: 4-->COMPLETELY DEPENDENT
COGNITION: 2 - DIFFICULTY WITH ORGANIZING THOUGHTS
BATHING: 4-->COMPLETELY DEPENDENT
BATHING: 4-->COMPLETELY DEPENDENT
WHICH_OF_THE_ABOVE_FUNCTIONAL_RISKS_HAD_A_RECENT_ONSET_OR_CHANGE?: COGNITION

## 2017-06-26 NOTE — PLAN OF CARE
Problem: Goal Outcome Summary  Goal: Goal Outcome Summary  Outcome: Improving  Pt remains on bipap at 50% and 10/5 with O2 sat 98% and is tolerating it well. R.T. Aware and has assessed pt x2. Has diminished breath sounds bilat and fine crackles to bases rt > left. Is fidgety at times and moves extremities x4. Spoke with pt's nurse Lisa at [facility] in [place] and pt's baseline is nonverbal and pt is W/C bound and transfers with a pat lift and assist of 2 staff. Is fed a pureed diet and thin liquids. Given Norco for chronic generalized pain when pt holds her head. NO Migraine Hx known per Lisa.

## 2017-06-26 NOTE — IP AVS SNAPSHOT
"          [facility]: [phone]                                              INTERAGENCY TRANSFER FORM - LAB / IMAGING / EKG / EMG RESULTS   2017                    Hospital Admission Date: 2017  MARVEL BRICE   : 1934  Sex: Female        Attending Provider: Cong Khoury MD     Allergies:  No Known Allergies    Infection:  None   Service:  HOSPITALIST    Ht:  1.651 m (5' 5\")   Wt:  96.5 kg (212 lb 11.9 oz)   Admission Wt:  96.4 kg (212 lb 8.4 oz)    BMI:  35.4 kg/m 2   BSA:  2.1 m 2            Patient PCP Information     Provider PCP Type    TONE Worthy CNP General         Lab Results - 3 Days      Blood culture [[id number]] (Abnormal)  Resulted: 17 1053, Result status: Preliminary result    Ordering provider: Meet Boston, DO  17 0409 Resulting lab: [facility]    Specimen Information    Type Source Collected On   Blood Hand, Right 17 0415          Components       Value Reference Range Flag Lab   Specimen Description Blood   59   Special Requests Right Hand   59   Culture Micro --  A 59   Result:         Gram negative rods Susceptibility testing done on previous specimen See Accession   Number [id number]  Critical Value, preliminary result only, called to and read back by ICU RN KEON   2017 XI     Micro Report Status Pending   59   Result:              Blood culture [[id number]] (Abnormal)  Resulted: 17 1045, Result status: Preliminary result    Ordering provider: Meet Boston, DO  17 0409 Resulting lab: [facility]    Specimen Information    Type Source Collected On   Blood Arm, Right 17 0435          Components       Value Reference Range Flag Lab   Specimen Description Blood   59   Special Requests Right Arm   59   Culture Micro --  A 59   Result:         Gram negative rods Susceptibility testing in progress  Critical Value, preliminary result only, called to and read back " by ICU RN KEON   1930 06/26/2017 XI  (Note)  POSITIVE for E. COLI by Verigene multiplex nucleic acid test. Final  identification and antimicrobial susceptibility testing will be  verified by standard methods.    Specimen tested with Verigene multiplex, gram-negative blood culture  nucleic acid test for the following targets: Acinetobacter sp.,  Citrobacter sp., Enterobacter sp., Proteus sp., E. coli, K.  pneumoniae/oxytoca, P. aeruginosa, and the following resistance  markers: CTXM, KPC, NDM, VIM, IMP and OXA.    Critical Value/Significant Value called to and read back by Heidy Rice RN at 1506 on [id number]     Micro Report Status Pending   59   Result:              Urine Culture Aerobic Bacterial [[id number]] (Abnormal)  Resulted: 06/28/17 0852, Result status: Preliminary result    Ordering provider: Meet Boston DO  06/26/17 0409 Resulting lab: [facility]    Specimen Information    Type Source Collected On   Urine Urine catheter 06/26/17 0450          Components       Value Reference Range Flag Lab   Specimen Description Catheterized Urine   59   Culture Micro --  A 59   Result:         >100,000 colonies/mL Escherichia coli  10,000 to 50,000 colonies/mL Gram positive cocci  Reincubate     Micro Report Status Pending   59   Result:     Organism: >100,000 colonies/mL Escherichia coli   59            Basic metabolic panel [[id number]] (Abnormal)  Resulted: 06/28/17 0739, Result status: Final result    Ordering provider: Margarita Davis PA-C  06/28/17 0000 Resulting lab: [facility]    Specimen Information    Type Source Collected On   Blood  06/28/17 0625          Components       Value Reference Range Flag Lab   Sodium 144 133 - 144 mmol/L  59   Potassium 3.7 3.4 - 5.3 mmol/L  59   Chloride 112 94 - 109 mmol/L H 59   Carbon Dioxide 24 20 - 32 mmol/L  59   Anion Gap 8 3 - 14 mmol/L  59   Glucose 139 70 - 99 mg/dL H 59   Urea Nitrogen 9 7 - 30 mg/dL  59   Creatinine  0.92 0.52 - 1.04 mg/dL  59   GFR Estimate 58 >60 mL/min/1.7m2 L 59   Comment:  Non  GFR Calc   GFR Estimate If Black 71 >60 mL/min/1.7m2  59   Comment:  African American GFR Calc   Calcium 8.5 8.5 - 10.1 mg/dL  59            CBC with platelets [[id number]] (Abnormal)  Resulted: 06/28/17 0727, Result status: Final result    Ordering provider: Margarita Davis PA-C  06/28/17 0000 Resulting lab: [facility]    Specimen Information    Type Source Collected On   Blood  06/28/17 0625          Components       Value Reference Range Flag Lab   WBC 10.4 4.0 - 11.0 10e9/L  59   RBC Count 4.31 3.8 - 5.2 10e12/L  59   Hemoglobin 11.3 11.7 - 15.7 g/dL L 59   Hematocrit 37.7 35.0 - 47.0 %  59   MCV 88 78 - 100 fl  59   MCH 26.2 26.5 - 33.0 pg L 59   MCHC 30.0 31.5 - 36.5 g/dL L 59   RDW 17.8 10.0 - 15.0 % H 59   Platelet Count 236 150 - 450 10e9/L  59            Glucose by meter [[id number]] (Abnormal)  Resulted: 06/27/17 2126, Result status: Final result    Ordering provider: Cong Khoury MD  06/27/17 2120 Resulting lab: POINT OF CARE TEST, GLUCOSE    Specimen Information    Type Source Collected On     06/27/17 2120          Components       Value Reference Range Flag Lab   Glucose 155 70 - 99 mg/dL H 170            Glucose by meter [[id number]] (Abnormal)  Resulted: 06/27/17 1636, Result status: Final result    Ordering provider: Cong Khoury MD  06/27/17 1631 Resulting lab: POINT OF CARE TEST, GLUCOSE    Specimen Information    Type Source Collected On     06/27/17 1631          Components       Value Reference Range Flag Lab   Glucose 133 70 - 99 mg/dL H 170            Glucose by meter [[id number]] (Abnormal)  Resulted: 06/27/17 1216, Result status: Final result    Ordering provider: Cong Khoury MD  06/27/17 1141 Resulting lab: POINT OF CARE TEST, GLUCOSE    Specimen Information    Type Source Collected On     06/27/17 1141          Components       Value Reference Range Flag Lab    Glucose 155 70 - 99 mg/dL H 170   Comment:  /RN Notified            TSH [[id number]]  Resulted: 06/27/17 0728, Result status: Final result    Ordering provider: Cong Khoury MD  06/27/17 0000 Resulting lab: [facility]    Specimen Information    Type Source Collected On   Blood  06/27/17 0642          Components       Value Reference Range Flag Lab   TSH 1.05 0.40 - 4.00 mU/L  59            Comprehensive metabolic panel [[id number]] (Abnormal)  Resulted: 06/27/17 0723, Result status: Final result    Ordering provider: Cong Khoury MD  06/27/17 0000 Resulting lab: [facility]    Specimen Information    Type Source Collected On   Blood  06/27/17 0642          Components       Value Reference Range Flag Lab   Sodium 142 133 - 144 mmol/L  59   Potassium 3.7 3.4 - 5.3 mmol/L  59   Chloride 112 94 - 109 mmol/L H 59   Carbon Dioxide 28 20 - 32 mmol/L  59   Anion Gap 2 3 - 14 mmol/L L 59   Glucose 152 70 - 99 mg/dL H 59   Urea Nitrogen 12 7 - 30 mg/dL  59   Creatinine 1.10 0.52 - 1.04 mg/dL H 59   GFR Estimate 47 >60 mL/min/1.7m2 L 59   Comment:  Non  GFR Calc   GFR Estimate If Black 57 >60 mL/min/1.7m2 L 59   Comment:  African American GFR Calc   Calcium 8.5 8.5 - 10.1 mg/dL  59   Bilirubin Total 0.9 0.2 - 1.3 mg/dL  59   Albumin 2.5 3.4 - 5.0 g/dL L 59   Protein Total 6.5 6.8 - 8.8 g/dL L 59   Alkaline Phosphatase 47 40 - 150 U/L  59   ALT 23 0 - 50 U/L  59   AST 22 0 - 45 U/L  59            Lactic acid whole blood [[id number]]  Resulted: 06/27/17 0701, Result status: Final result    Ordering provider: Margarita Davis PA-C  06/27/17 0000 Resulting lab: [facility]    Specimen Information    Type Source Collected On   Blood  06/27/17 0642          Components       Value Reference Range Flag Lab   Lactic Acid 1.6 0.7 - 2.1 mmol/L  59            CBC with platelets [[id number]] (Abnormal)  Resulted: 06/27/17 0700, Result status: Final result     Ordering provider: Margarita Davis PA-C  06/27/17 0000 Resulting lab: [facility]    Specimen Information    Type Source Collected On   Blood  06/27/17 0642          Components       Value Reference Range Flag Lab   WBC 17.7 4.0 - 11.0 10e9/L H 59   RBC Count 4.56 3.8 - 5.2 10e12/L  59   Hemoglobin 12.1 11.7 - 15.7 g/dL  59   Hematocrit 39.8 35.0 - 47.0 %  59   MCV 87 78 - 100 fl  59   MCH 26.5 26.5 - 33.0 pg  59   MCHC 30.4 31.5 - 36.5 g/dL L 59   RDW 17.6 10.0 - 15.0 % H 59   Platelet Count 246 150 - 450 10e9/L  59            Glucose by meter [[id number]] (Abnormal)  Resulted: 06/26/17 2136, Result status: Final result    Ordering provider: Cong Khoury MD  06/26/17 2128 Resulting lab: POINT OF CARE TEST, GLUCOSE    Specimen Information    Type Source Collected On     06/26/17 2128          Components       Value Reference Range Flag Lab   Glucose 144 70 - 99 mg/dL H 170            Glucose by meter [[id number]] (Abnormal)  Resulted: 06/26/17 1701, Result status: Final result    Ordering provider: Cong Khoury MD  06/26/17 1654 Resulting lab: POINT OF CARE TEST, GLUCOSE    Specimen Information    Type Source Collected On     06/26/17 1654          Components       Value Reference Range Flag Lab   Glucose 126 70 - 99 mg/dL H 170   Comment:  /RN Notified            Methicillin Resistant Staph Aureus PCR [[id number]]  Resulted: 06/26/17 1501, Result status: Final result    Ordering provider: Margarita Davis PA-C  06/26/17 0814 Resulting lab: [facility]    Specimen Information    Type Source Collected On   Nasal Swab  06/26/17 0841          Components       Value Reference Range Flag Lab   Specimen Description Nares   59   S Aur Meth Resis PCR -- NEG  225   Result:         Negative  MRSA Negative: SA Negative  MRSA and Staphylococcus aureus target DNA not   detected, presumed negative for MRSA and SA colonization or the number of   bacteria present may be below  the limit of detection for the assay. FDA   approved assay performed using [employer] GeneXpert(R) real-time PCR.              Hemoglobin A1c [[id number]] (Abnormal)  Resulted: 06/26/17 1359, Result status: Final result    Ordering provider: Margarita Davis PA-C  06/26/17 1327 Resulting lab: [facility]    Specimen Information    Type Source Collected On   Blood  06/26/17 0415          Components       Value Reference Range Flag Lab   Hemoglobin A1C 6.6 4.3 - 6.0 % H 59            Glucose by meter [[id number]] (Abnormal)  Resulted: 06/26/17 1336, Result status: Final result    Ordering provider: Cong Khoury MD  06/26/17 1316 Resulting lab: POINT OF CARE TEST, GLUCOSE    Specimen Information    Type Source Collected On     06/26/17 1316          Components       Value Reference Range Flag Lab   Glucose 187 70 - 99 mg/dL H 170   Comment:  /RN Notified            Glucose by meter [[id number]] (Abnormal)  Resulted: 06/26/17 1015, Result status: Final result    Ordering provider: Cong Khoury MD  06/26/17 0843 Resulting lab: POINT OF CARE TEST, GLUCOSE    Specimen Information    Type Source Collected On     06/26/17 0843          Components       Value Reference Range Flag Lab   Glucose 163 70 - 99 mg/dL H 170            Lactic acid whole blood [[id number]] (Abnormal)  Resulted: 06/26/17 0905, Result status: Final result    Ordering provider: Margarita Davis PA-C  06/26/17 0814 Resulting lab: [facility]    Specimen Information    Type Source Collected On   Blood  06/26/17 0841          Components       Value Reference Range Flag Lab   Lactic Acid 5.1 0.7 - 2.1 mmol/L HH 59   Comment:         Critical Value called to and read back by  ANABEL TOSCANO RN 0904 2017 JTW              UA with Microscopic [[id number]] (Abnormal)  Resulted: 06/26/17 0512, Result status: Final result    Ordering provider: Meet Boston DO  06/26/17 0409 Resulting lab: [facility]     Specimen Information    Type Source Collected On   Urine Urine catheter 06/26/17 0450          Components       Value Reference Range Flag Lab   Color Urine Yellow   59   Appearance Urine Slightly Cloudy   59   Glucose Urine Negative NEG mg/dL  59   Bilirubin Urine Negative NEG  59   Ketones Urine 5 NEG mg/dL A 59   Specific Gravity Urine 1.010 1.003 - 1.035  59   Blood Urine Trace NEG A 59   pH Urine 5.5 5.0 - 7.0 pH  59   Protein Albumin Urine 10 NEG mg/dL A 59   Urobilinogen mg/dL Normal 0.0 - 2.0 mg/dL  59   Nitrite Urine Positive NEG A 59   Leukocyte Esterase Urine Large NEG A 59   Source Catheterized Urine   59   WBC Urine 53 0 - 2 /HPF H 59   RBC Urine 31 0 - 2 /HPF H 59   WBC Clumps Present NEG /HPF A 59   Squamous Epithelial /HPF Urine 5 0 - 1 /HPF H 59   Mucous Urine Present NEG /LPF A 59            Comprehensive metabolic panel [[id number]] (Abnormal)  Resulted: 06/26/17 0503, Result status: Final result    Ordering provider: Meet Boston DO  06/26/17 0409 Resulting lab: [facility]    Specimen Information    Type Source Collected On   Blood  06/26/17 0415          Components       Value Reference Range Flag Lab   Sodium 141 133 - 144 mmol/L  59   Potassium 4.2 3.4 - 5.3 mmol/L  59   Chloride 108 94 - 109 mmol/L  59   Carbon Dioxide 21 20 - 32 mmol/L  59   Anion Gap 12 3 - 14 mmol/L  59   Glucose 157 70 - 99 mg/dL H 59   Urea Nitrogen 15 7 - 30 mg/dL  59   Creatinine 0.89 0.52 - 1.04 mg/dL  59   GFR Estimate 61 >60 mL/min/1.7m2  59   Comment:  Non  GFR Calc   GFR Estimate If Black 74 >60 mL/min/1.7m2  59   Comment:  African American GFR Calc   Calcium 9.2 8.5 - 10.1 mg/dL  59   Bilirubin Total 0.9 0.2 - 1.3 mg/dL  59   Albumin 3.6 3.4 - 5.0 g/dL  59   Protein Total 7.9 6.8 - 8.8 g/dL  59   Alkaline Phosphatase 80 40 - 150 U/L  59   ALT 31 0 - 50 U/L  59   AST 22 0 - 45 U/L  59            Troponin I [[id number]]  Resulted: 06/26/17 0503, Result status: Final result     Ordering provider: Meet Boston, DO  06/26/17 0417 Resulting lab: [facility]    Specimen Information    Type Source Collected On   Blood  06/26/17 0415          Components       Value Reference Range Flag Lab   Troponin I ES 0.027 0.000 - 0.045 ug/L  59   Comment:         The 99th percentile for upper reference range is 0.045 ug/L.  Troponin values   in   the range of 0.045 - 0.120 ug/L may be associated with risks of adverse   clinical events.              Lactic acid whole blood [[id number]] (Abnormal)  Resulted: 06/26/17 0450, Result status: Final result    Ordering provider: Meet Boston, DO  06/26/17 0409 Resulting lab: [facility]    Specimen Information    Type Source Collected On   Blood  06/26/17 0435          Components       Value Reference Range Flag Lab   Lactic Acid 5.2 0.7 - 2.1 mmol/L  59   Comment:  Critical Value called to and read back by  DINORAH CRONIN 6/26/17 AT 0450 BY               Blood gas venous [[id number]]  Resulted: 06/26/17 0450, Result status: Final result    Ordering provider: Meet Boston, DO  06/26/17 0435 Resulting lab: [facility]    Specimen Information    Type Source Collected On     06/26/17 0435          Components       Value Reference Range Flag Lab   Ph Venous 7.32 7.32 - 7.43 pH  59   PCO2 Venous 46 40 - 50 mm Hg  59   PO2 Venous 27 25 - 47 mm Hg  59   Bicarbonate Venous 24 21 - 28 mmol/L  59   Base Deficit Venous 2.2 mmol/L  59   Comment:  Reference range:  -7.7 to 1.9   FIO2 HIDE   59            INR [[id number]]  Resulted: 06/26/17 0447, Result status: Final result    Ordering provider: Meet Boston, DO  06/26/17 0409 Resulting lab: [facility]    Specimen Information    Type Source Collected On   Blood  06/26/17 0415          Components       Value Reference Range Flag Lab   INR 0.98 0.86 - 1.14  59            CBC with platelets differential [[id number]] (Abnormal)  Resulted: 06/26/17  0446, Result status: Final result    Ordering provider: Meet Boston DO  06/26/17 0409 Resulting lab: [facility]    Specimen Information    Type Source Collected On   Blood  06/26/17 0415          Components       Value Reference Range Flag Lab   WBC 17.3 4.0 - 11.0 10e9/L H 59   RBC Count 5.21 3.8 - 5.2 10e12/L H 59   Hemoglobin 13.7 11.7 - 15.7 g/dL  59   Hematocrit 44.3 35.0 - 47.0 %  59   MCV 85 78 - 100 fl  59   MCH 26.3 26.5 - 33.0 pg L 59   MCHC 30.9 31.5 - 36.5 g/dL L 59   RDW 17.2 10.0 - 15.0 % H 59   Platelet Count 316 150 - 450 10e9/L  59   Diff Method Automated Method   59   % Neutrophils 94.0 %  59   % Lymphocytes 4.1 %  59   % Monocytes 1.0 %  59   % Eosinophils 0.1 %  59   % Basophils 0.2 %  59   % Immature Granulocytes 0.6 %  59   Absolute Neutrophil 16.2 1.6 - 8.3 10e9/L H 59   Absolute Lymphocytes 0.7 0.8 - 5.3 10e9/L L 59   Absolute Monocytes 0.2 0.0 - 1.3 10e9/L  59   Absolute Eosinophils 0.0 0.0 - 0.7 10e9/L  59   Absolute Basophils 0.0 0.0 - 0.2 10e9/L  59   Abs Immature Granulocytes 0.1 0 - 0.4 10e9/L  59            Testing Performed By     Lab - Abbreviation Name Director Address Valid Date Range    59 - Unknown [facility] Unknown [address] [facility] [address] 12/31/14 1006 - Present    170 - Unknown POINT OF CARE TEST, GLUCOSE Unknown Unknown 10/31/11 1114 - Present    225 - Unknown [facility] Unknown [address] [facility] [id number] 12/19/14 0954 - Present            Unresulted Labs (24h ago through future)    Start       Ordered    06/29/17 0600  Platelet count  (Pharmacological Prophylaxis - enoxaparin (LOVENOX) *Use only if creatinine clearance is greater than 30 mL/min)  EVERY THREE DAYS,   Routine     Comments:  Repeat every 3 days while on VTE prophylaxis.  Notify provider and hold enoxaparin if platelet count falls by 50% of baseline. If no result is listed, this lab has not been done the past 365  days. LATEST LAB RESULT: Platelet Count (10e9/L)       Date                     Value                 06/26/2017               316              ----------    06/26/17 0814    06/29/17 0000  Creatinine  (Pharmacological Prophylaxis - enoxaparin (LOVENOX) *Use only if creatinine clearance is greater than 30 mL/min)  EVERY THREE DAYS,   Routine     Comments:  Repeat every 3 days while on VTE prophylaxis.    06/26/17 0814 06/27/17 0600  CBC with platelets  DAILY,   Routine     Comments:  Last Lab Result: Hemoglobin (g/dL)       Date                     Value                 06/26/2017               13.7             ----------    06/26/17 0814    06/27/17 0600  Basic metabolic panel  DAILY,   Routine      06/26/17 0814         Imaging Results - 3 Days      XR Chest Port 1 View [[id number]]  Resulted: 06/26/17 0617, Result status: Final result    Ordering provider: Meet Boston DO  06/26/17 0520 Resulted by: Lisa Diop MD    Performed: 06/26/17 0524 - 06/26/17 0542 Resulting lab: RADIOLOGY RESULTS    Narrative:       XR CHEST PORT 1 VIEW  6/26/2017 5:42 AM      HISTORY: AMS, fever.     COMPARISON: None.    FINDINGS: Semi-upright portable chest. The heart is at the upper  limits of normal in size without pulmonary edema. Thoracic aorta is  calcified and mildly tortuous. Curvilinear scar at the left lung base.  Small calcified granuloma in the left upper lobe laterally. The lungs  are otherwise clear. The right hemidiaphragm is elevated. No  pneumothorax.      Impression:       IMPRESSION: No acute abnormality.    LISA DIOP MD      Testing Performed By     Lab - Abbreviation Name Director Address Valid Date Range    104 - Rad [facility] RADIOLOGY RESULTS Unknown Unknown 02/16/05 1553 - Present            Encounter-Level Documents:     There are no encounter-level documents.      Order-Level Documents:     There are no order-level documents.

## 2017-06-26 NOTE — ED NOTES
Talked with [facility], [phone], and talked with Zach informing her of patient's admission to hospital (ICU).  She had attempted to call patient's guardian and family with no response when patient was sent to hospital this am 0330.  Zach stated that she was trying again and would leave message to call ICU.  Will report off to dayshift to attempt again this am,  if no response do try to call guardian again.

## 2017-06-26 NOTE — IP AVS SNAPSHOT
` ` Patient Information     Patient Name Sex     Nguyen Borja ([id number]) Female 1934       Room Bed    1007 1007-01      Patient Demographics     Address Phone    [place] [address] [place]  [place] [address] [phone] (Home)      Patient Ethnicity & Race     Ethnic Group Patient Race    American White      Emergency Contact(s)     Name Relation Home Work Mobile    Marlene Betancourt - Legal Guardian Other  [phone]       Documents on File        Status Date Received Description       Documents for the Patient    Other  09 VERIF/SNAP SITE    Face Sheet Received () 09     Privacy Notice - Dennis Received 17     Insurance Card       External Medication Information Consent Accepted 16     Patient ID       Consent for Services - Hospital/Clinic Received () 11     Face Sheet Received () 10/18/10     Advance Directives and Living Will Received 12/30/10 Resuscitation Guidelines 09    Face Sheet Received () 11     Advance Directives and Living Will Received 03/15/11 Resuscitation Guidelines 3/1/11    Consent for Services - Hospital/Clinic Received () 12     Consent for Services - Hospital/Clinic Received () 12     Advance Directives and Living Will Received 12 POLST 12    Consent for EHR Access  13 Copied from existing Consent for services - IP/ED collected on 2010    [facility] Specified Other       Legal Documentation  13 LETTERS OF GUARDIANSHIP OF THE PERSON-2010    HIM HARIKA Authorization  14 NGS 13    HIM HARIKA Authorization  14 [facility]    HIM HARIKA Authorization  14 [facility] HARIKA Authorization  14     Consent for Services - Geriatrics Received 14     Advance Directives and Living Will Received 05/14/15 Legal Guardian for Healthcare 2010    Consent for Services/Privacy Notice - Hospital/Clinic        Business/Insurance/Care Coordination/Health Form - Patient  05/06/16 [facility]-AUTHORIZATION REQUEST FOR MOBILITY DEVICES 4/22/2016    HIM HARIKA Authorization  07/01/16 [employer]/[place]    HIM HARIKA Authorization  07/21/16 BCBS - [place] PERTS TO SUPPORT NEED FOR WHEELCHAIR    Advance Directives and Living Will Received 09/15/16 POLST 09/14/2016       Documents for the Encounter    CMS IM for Patient Signature Received 06/27/17     ECG   ECG Report      Admission Information     Attending Provider Admitting Provider Admission Type Admission Date/Time    Cong Khoury MD Khan, Raza A, MD Emergency 06/26/17  0404    Discharge Date Hospital Service Auth/Cert Status Service Area     Hospitalist [facility]    Unit Room/Bed Admission Status       [place] [facility] 1007/1007-01 Admission (Confirmed)       Admission     Complaint    Sepsis (H)      Hospital Account     Name Acct ID Class Status Primary Coverage    Nguyen Borja [id number] Inpatient Open MEDICA - [employer]            Guarantor Account (for Hospital Account #[id number])     Name Relation to Pt Service Area Active? Acct Type    Nguyen Borja Self FCS Yes Personal/Family    Address Phone          [facility]  [address]  [place] [id number] [phone](H)  [phone](O)              Coverage Information (for Hospital Account #[id number])     F/O Payor/Plan Precert #    MEDICA/[employer]     Subscriber Subscriber #    Nguyen Borja [id number]    Address Phone    [address] [address]  [place] [address] [phone]

## 2017-06-26 NOTE — PROGRESS NOTES
"SPIRITUAL HEALTH SERVICES  SPIRITUAL ASSESSMENT Progress Note   Hospital Location [place] ICU    Consult with Staff. Was informed that Nguyen \"may die\" briefly visited and prayed for her.                                                                                                                                            Cosme Garcia MA.  Associate Staff   Pager [phone]    "

## 2017-06-26 NOTE — IP AVS SNAPSHOT
` `     [facility] INTENSIVE CARE: [phone]            Medication Administration Report for Nguyen Borja as of 06/28/17 1102   Legend:    Given Hold Not Given Due Canceled Entry Other Actions    Time Time (Time) Time  Time-Action       Inactive    Active    Linked        Medications 06/22/17 06/23/17 06/24/17 06/25/17 06/26/17 06/27/17 06/28/17    acetaminophen (TYLENOL) Suppository 650 mg  Dose: 650 mg Freq: EVERY 4 HOURS PRN Route: RE  PRN Reason: mild pain  Start: 06/26/17 0814   Admin Instructions: Alternate ibuprofen (if ordered) with acetaminophen.  Maximum acetaminophen dose from all sources = 75 mg/kg/day not to exceed 4 grams/day.          2141 (650 mg)-Given            azithromycin (ZITHROMAX) tablet 250 mg  Dose: 250 mg Freq: EVERY 24 HOURS Route: PO  Indications of Use: COMMUNITY ACQUIRED PNEUMONIA  Start: 06/27/17 0800   End: 07/01/17 0759   Admin Instructions: Schedule subsequent doses 24 hours from first dose.          0827 (250 mg)-Given        0805 (250 mg)-Given           enoxaparin (LOVENOX) injection 40 mg  Dose: 40 mg Freq: EVERY 24 HOURS Route: SC  Start: 06/26/17 0830   Admin Instructions: HOLD if platelet count falls below 50% of baseline or less than 100,000/ L and notify provider.         0956 (40 mg)-Given        0827 (40 mg)-Given        0805 (40 mg)-Given           glucose 40 % gel 15-30 g  Dose: 15-30 g Freq: EVERY 15 MIN PRN Route: PO  PRN Reason: low blood sugar  Start: 06/26/17 1437   Admin Instructions: Give 15 g for BG 51 to 69 mg/dL IF patient is conscious and able to swallow. Give 30 g for BG less than or equal to 50 mg/dL IF patient is conscious and able to swallow. Do NOT give glucose gel via enteral tube.  IF patient has enteral tube: give apple juice 120 mL (4 oz or 15 g of CHO) via enteral tube for BG 51 to 69 mg/dL.  Give apple juice 240 mL (8 oz or 30 g of CHO) via enteral tube for BG less than or equal to 50 mg/dL.    ~Oral gel is preferable for conscious and  able to swallow patient.   ~IF gel unavailable or patient refuses may provide apple juice 120 mL (4 oz or 15 g of CHO). Document juice on I and O flowsheet.              Or  dextrose 50 % injection 25-50 mL  Dose: 25-50 mL Freq: EVERY 15 MIN PRN Route: IV  PRN Reason: low blood sugar  Start: 06/26/17 1437   Admin Instructions: Use if have IV access, BG less than 70 mg/dL and meet dose criteria below:  Dose if conscious and alert (or disorientated) and NPO = 25 mL  Dose if unconscious / not alert = 50 mL  Vesicant.              Or  glucagon injection 1 mg  Dose: 1 mg Freq: EVERY 15 MIN PRN Route: SC  PRN Reason: low blood sugar  PRN Comment: May repeat x 1 only  Start: 06/26/17 1437   Admin Instructions: May give SQ or IM. ONLY use glucagon IF patient has NO IV access AND is UNABLE to swallow AND blood glucose is LESS than or EQUAL to 50 mg/dL.               insulin aspart (NovoLOG) inj (RAPID ACTING)  Dose: 1-5 Units Freq: AT BEDTIME Route: SC  Start: 06/26/17 2200   Admin Instructions: MEDIUM INSULIN RESISTANCE DOSING    Do Not give Bedtime Correction Insulin if BG less than  200.   For  - 249 give 1 units.   For  - 299 give 2 units.   For  - 349 give 3 units.   For  -399 give 4 units.   For BG greater than or equal to 400 give 5 units.  Notify provider if glucose greater than or equal to 350 mg/dL after administration of correction dose.  If given at mealtime, must be administered 5 min before meal or immediately after.         (2129)-Not Given [C]        (2147)-Not Given        [ ] 2200           insulin aspart (NovoLOG) inj (RAPID ACTING)  Dose: 1-7 Units Freq: 3 TIMES DAILY BEFORE MEALS Route: SC  Start: 06/26/17 1630   Admin Instructions: Correction Scale - MEDIUM INSULIN RESISTANCE DOSING     Do Not give Correction Insulin if Pre-Meal BG less than 140.   For Pre-Meal  - 189 give 1 unit.   For Pre-Meal  - 239 give 2 units.   For Pre-Meal  - 289 give 3 units.   For  Pre-Meal  - 339 give 4 units.   For Pre-Meal - 399 give 5 units.   For Pre-Meal -449 give 6 units  For Pre-Meal BG greater than or equal to 450 give 7 units.   To be given with prandial insulin, and based on pre-meal blood glucose.    Notify provider if glucose greater than or equal to 350 mg/dL after administration of correction dose.  If given at mealtime, must be administered 5 min before meal or immediately after.         (1655)-Not Given [C]        0828 (1 Units)-Given       1142 (1 Units)-Given [C]       (1650)-Not Given [C]        (0753)-Not Given       [ ] 1130       [ ] 1630           ipratropium - albuterol 0.5 mg/2.5 mg/3 mL (DUONEB) neb solution 3 mL  Dose: 3 mL Freq: EVERY 4 HOURS WHILE AWAKE Route: NEBULIZATION  Start: 06/26/17 1200        1312 (3 mL)-Given       1723 (3 mL)-Given       2048 (3 mL)-Given       2313 (3 mL)-Given        0854 (3 mL)-Given       1314 (3 mL)-Given       1550 (3 mL)-Given [C]       1949 (3 mL)-Given       2320 (3 mL)-Given        0323 (3 mL)-Given       0857 (3 mL)-Given       [ ] 1200       [ ] 1600       [ ] 2000           levothyroxine (SYNTHROID/LEVOTHROID) tablet 75 mcg  Dose: 75 mcg Freq: EVERY MORNING BEFORE BREAKFAST Route: PO  Start: 06/26/17 0830        (0956)-Not Given [C]       1123 (75 mcg)-Given        0827 (75 mcg)-Given        0805 (75 mcg)-Given           miconazole (MICATIN; MICRO GUARD) 2 % powder  Freq: DAILY PRN Route: Top  PRN Reason: other  PRN Comment: dry skin  Start: 06/26/17 0823   Admin Instructions: Apply to affected area.  Formulary substitution for nystatin powder          1850 ( )-Given [C]            naloxone (NARCAN) injection 0.1-0.4 mg  Dose: 0.1-0.4 mg Freq: EVERY 2 MIN PRN Route: IV  PRN Reason: opioid reversal  Start: 06/26/17 0814   Admin Instructions: For respiratory rate LESS than or EQUAL to 8.  Partial reversal dose:  0.1 mg titrated q 2 minutes for Analgesia Side Effects Monitoring Sedation Level of 3 (frequently  drowsy, arousable, drifts to sleep during conversation).Full reversal dose:  0.4 mg bolus for Analgesia Side Effects Monitoring Sedation Level of 4 (somnolent, minimal or no response to stimulation).               ondansetron (ZOFRAN-ODT) ODT tab 4 mg  Dose: 4 mg Freq: EVERY 6 HOURS PRN Route: PO  PRN Reason: nausea  Start: 06/26/17 0814   Admin Instructions: This is Step 1 of nausea and vomiting management.  If nausea not resolved in 15 minutes, go to Step 2 prochlorperazine (COMPAZINE). Do not push through foil backing. Peel back foil and gently remove. Place on tongue immediately. Administration with liquid unnecessary              Or  ondansetron (ZOFRAN) injection 4 mg  Dose: 4 mg Freq: EVERY 6 HOURS PRN Route: IV  PRN Reasons: nausea,vomiting  Start: 06/26/17 0814   Admin Instructions: This is Step 1 of nausea and vomiting management.  If nausea not resolved in 15 minutes, go to Step 2 prochlorperazine (COMPAZINE).  Irritant.               piperacillin-tazobactam (ZOSYN) infusion 3.375 g  Dose: 3.375 g Freq: EVERY 6 HOURS Route: IV  Indications of Use: ASPIRATION PNEUMONIA  Last Dose: 3.375 g (06/28/17 0948)  Start: 06/26/17 1030        1042 (3.375 g)-New Bag       1652 (3.375 g)-New Bag       2125 (3.375 g)-New Bag        0511 (3.375 g)-New Bag       (1000)-Not Given [C]       1026 (3.375 g)-New Bag       1649 (3.375 g)-New Bag       2129 (3.375 g)-New Bag        0327 (3.375 g)-New Bag       0948 (3.375 g)-New Bag       [ ] 1600       [ ] 2200           prochlorperazine (COMPAZINE) injection 5 mg  Dose: 5 mg Freq: EVERY 6 HOURS PRN Route: IV  PRN Reasons: nausea,vomiting  Start: 06/26/17 0814   Admin Instructions: This is Step 2 of nausea and vomiting management.   If nausea not resolved in 15 minutes, give metoclopramide (REGLAN) if ordered (step 3 of nausea and vomiting management)              Or  prochlorperazine (COMPAZINE) tablet 5 mg  Dose: 5 mg Freq: EVERY 6 HOURS PRN Route: PO  PRN Reason:  vomiting  Start: 06/26/17 0814   Admin Instructions: This is Step 2 of nausea and vomiting management.   If nausea not resolved in 15 minutes, give metoclopramide (REGLAN) if ordered (step 3 of nausea and vomiting management)              Or  prochlorperazine (COMPAZINE) Suppository 12.5 mg  Dose: 12.5 mg Freq: EVERY 12 HOURS PRN Route: RE  PRN Reasons: nausea,vomiting  Start: 06/26/17 0814   Admin Instructions: This is Step 2 of nausea and vomiting management.   If nausea not resolved in 15 minutes, give metoclopramide (REGLAN) if ordered (step 3 of nausea and vomiting management)              Completed Medications  Medications 06/22/17 06/23/17 06/24/17 06/25/17 06/26/17 06/27/17 06/28/17         Dose: 650 mg Freq: ONCE Route: RE  Start: 06/26/17 0410   End: 06/26/17 0446   Admin Instructions: Maximum acetaminophen dose from all sources = 75 mg/kg/day not to exceed 4 grams/day.         0446 (650 mg)-Given               Dose: 500 mg Freq: EVERY 24 HOURS Route: IV  Indications of Use: COMMUNITY ACQUIRED PNEUMONIA  Last Dose: Stopped (06/26/17 0755)  Start: 06/26/17 0559   End: 06/26/17 0755   Admin Instructions: FIRST DOSE STAT.  Start within 4 hours of patient's arrival to hospital.         0642 (500 mg)-New Bag       0755-ED Infusing on Admission/transfer               Dose: 1 g Freq: ONCE Route: IV  Indications of Use: COMMUNITY ACQUIRED PNEUMONIA  Last Dose: Stopped (06/26/17 0642)  Start: 06/26/17 0607   End: 06/26/17 0642   Admin Instructions: FIRST DOSE STAT, start within 4 hours of patient's arrival to hospital.<br>To total 2gm dose in ED         0611 (1 g)-New Bag       0642-Stopped               Dose: 1 g Freq: ONCE Route: IV  Indications of Use: COMMUNITY ACQUIRED PNEUMONIA  Last Dose: Stopped (06/26/17 0537)  Start: 06/26/17 0415   End: 06/26/17 0537        0459 (1 g)-New Bag       0537-Stopped               Dose: 3 mL Freq: ONCE Route: NEBULIZATION  Start: 06/26/17 0416   End: 06/26/17 0424        0424  (3 mL)-Given               Dose: 2,000 mL Freq: ONCE Route: IV  Last Dose: Stopped (06/26/17 0642)  Start: 06/26/17 0410   End: 06/26/17 0642   Admin Instructions: Bolus 1 L at a time as rapidly as possible until MAP greater than or equal to 65 mmHg and patient appears euvolemic, then 150 mL/hr.  MAX 3 Liters         0446 (2,000 mL)-New Bag       0642-Stopped            Discontinued Medications  Medications 06/22/17 06/23/17 06/24/17 06/25/17 06/26/17 06/27/17 06/28/17         Rate: 100 mL/hr Freq: CONTINUOUS Route: IV  Start: 06/26/17 0815   End: 06/26/17 1614        0829 ( )-New Bag       1614-Med Discontinued           Dose: 2 g Freq: EVERY 24 HOURS Route: IV  Indications of Use: COMMUNITY ACQUIRED PNEUMONIA  Start: 06/27/17 0600   End: 06/26/17 1002        1002-Med Discontinued           Dose: 3 mL Freq: EVERY 4 HOURS WHILE AWAKE Route: NEBULIZATION  Start: 06/26/17 1000   End: 06/26/17 1142        0908 (3 mL)-Given       1142-Med Discontinued           Rate: 150 mL/hr Freq: CONTINUOUS Route: IV  Start: 06/26/17 0410   End: 06/26/17 0746   Admin Instructions: Start after LR bolus.         (0613)-Not Given [C]       0643 ( )-New Bag       0746-Med Discontinued           Freq: DAILY PRN Route: Top  PRN Reason: dry skin  Start: 06/26/17 0814   End: 06/26/17 0821   Admin Instructions: Apply to affected skin         0821-Med Discontinued      Medications 06/22/17 06/23/17 06/24/17 06/25/17 06/26/17 06/27/17 06/28/17

## 2017-06-26 NOTE — IP AVS SNAPSHOT
MRN:[id number]                      After Visit Summary   6/26/2017    Nguyen Borja    MRN: [id number]           Thank you!     Thank you for choosing [facility] for your care. Our goal is always to provide you with excellent care. Hearing back from our patients is one way we can continue to improve our services. Please take a few minutes to complete the written survey that you may receive in the mail after you visit with us. Thank you!        Patient Information     Date Of Birth          1/11/1934        Designated Caregiver       Most Recent Value    Caregiver    Will someone help with your care after discharge? yes    Name of designated caregiver Marlene Betancourt    Phone number of caregiver office)[phone]  Emerg. #[phone]    Caregiver address [place]  is court appt. guardian      About your hospital stay     You were admitted on:  June 26, 2017 You last received care in the:  [facility]    You were discharged on:  June 28, 2017       Who to Call     For medical emergencies, please call 911.  For non-urgent questions about your medical care, please call your primary care provider or clinic, [phone]          Attending Provider     Provider Specialty    [place],  Emergency Medicine    Cong Khoury MD Internal Medicine       Primary Care Provider Office Phone # Fax #    Sandie TONE Hayes [phone]      After Care Instructions     Activity - Up with nursing assistance           Advance Diet as Tolerated       Follow this diet upon discharge: Orders Placed This Encounter      Dysphagia Diet Level 1 Pureed Thin Liquids (water, ice chips, juice, milk gelatin, ice cream, etc)            Fall precautions           General info for SNF       Length of Stay Estimate: Long Term Care  Condition at Discharge: Improving  Level of care:skilled   Rehabilitation Potential: Fair  Admission H&P remains valid and up-to-date:  Yes  Recent Chemotherapy: N/A  Use Nursing Home Standing Orders: Yes            Mantoux instructions       Give two-step Mantoux (PPD) Per Facility Policy Yes                  Additional Services     Home Care Referral       _______________________________________________________________    Your provider has referred you to: [facility]: [facility] [phone]   [email or web address]    Extended Emergency Contact Information  Primary Emergency Contact: Marlene Betancourt - Legal Guardian  Address: [address], [address]           [place] [address] [place]  Home Phone: [phone]  Work Phone: [phone]  Relation: Other    Patient Anticipated Discharge Date: 6/28/17   RN, PT, HHA to begin 24 - 48 hours after discharge.  PLEASE EVALUATE AND TREAT (Evaluation timeline is 24 - 48 hrs. Please call if there is need for a variance to this timeline).    REASON FOR REFERRAL: Hospice - Diagnosis: FAST criteria    ADDITIONAL SERVICES NEEDED: None    OTHER PERTINENT INFORMATION: Patient was last seen by provider on 6/28/17 for sepsis.    No current outpatient prescriptions on file.    Patient Active Problem List:     Hypothyroidism     Advance Care Planning     Hyperlipidemia LDL goal <130     Primary osteoarthritis of both knees     Annual physical exam due July 2017     Hypertension, benign essential, goal below 140/90     Alzheimer's disease     Generalized pain     Sepsis (H)     UTI (urinary tract infection)     CAP (community acquired pneumonia)     Elevated lactic acid level      Documentation of Face to Face and Certification for Home Health Services    I certify that patientNguyen is under my care and that I, or a Nurse Practitioner or Physician's Assistant working with me, had a face-to-face encounter that meets the physician face-to-face encounter requirements with this patient on:  6/28/2017.    This encounter with the patient was in whole, or in part, for the following medical condition, which is the primary reason for Home Health Care: Hospice.    I certify that, based on my findings, the following services are medically necessary Home Health Services: Nursing    My clinical findings support the need for the above services because: Nurse is needed: To provide hospice care.    Further, I certify that my clinical findings support that this patient is homebound (i.e. absences from home require considerable and taxing effort and are for medical reasons or Scientologist services or infrequently or of short duration when for other reasons) because: Requires assistance of another person or specialized equipment to access medical services because patient: Requires supervision of another for safe transfer..    Based on the above findings, I certify that this patient is confined to the home and needs intermittent skilled nursing care, physical therapy and/or speech therapy.  The patient is under my care, and I have initiated the establishment of the plan of care.  This patient will be followed by a physician who will periodically review the plan of care.    Physician/Provider to provide follow up care: Sandie Ceja [place] certified Physician at time of discharge: Dr. Cong Khoury    Please be aware that coverage of these services is subject to the terms and limitations of your health insurance plan.  Call member services at your health plan with any benefit or coverage questions.            Occupational Therapy Adult Consult       Evaluate and treat as clinically indicated.    Reason:  Weakness            Physical Therapy Adult Consult       Evaluate and treat as clinically indicated.    Reason:  Weakness                  Pending Results     Date and Time Order Name Status Description    6/26/2017 0409 Blood culture Preliminary     6/26/2017 0409 Blood culture Preliminary     6/26/2017  "0409 Urine Culture Aerobic Bacterial Preliminary             Statement of Approval     Ordered          17 1043  I have reviewed and agree with all the recommendations and orders detailed in this document.  EFFECTIVE NOW     Approved and electronically signed by:  Cong Khoury MD             Admission Information     Date & Time Provider Department Dept. Phone    2017 Cong Khoury MD [facility] [phone]      Your Vitals Were     Blood Pressure Pulse Temperature Respirations Weight Pulse Oximetry    92/74 76 98.6  F (37  C) (Oral) 16 96.5 kg (212 lb 11.9 oz) 94%    BMI (Body Mass Index)                   35.4 kg/m2           MyChart Information     OrderUp lets you send messages to your doctor, view your test results, renew your prescriptions, schedule appointments and more. To sign up, go to [email or web address] . Click on \"Log in\" on the left side of the screen, which will take you to the Welcome page. Then click on \"Sign up Now\" on the right side of the page.     You will be asked to enter the access code listed below, as well as some personal information. Please follow the directions to create your username and password.     Your access code is: [email or web address]  Expires: 2017  6:01 AM     Your access code will  in 90 days. If you need help or a new code, please call your [place] clinic or [phone].        Care EveryWhere ID     This is your Care EveryWhere ID. This could be used by other organizations to access your [place] medical records  [id number]        Equal Access to Services     [facility] JANEEN : Hadii miroslava Núñez, waaxda luqadaha, qaybta kaalmada isra, laura headley. So [facility] [phone].    ATENCIÓN: Si habla español, tiene a crowley disposición servicios gratuitos de asistencia lingüística. Llame al [phone].    We comply with applicable federal civil rights laws and [place] laws. We do not discriminate on " the basis of race, color, national origin, age, disability sex, sexual orientation or gender identity.               Review of your medicines      START taking        Dose / Directions    amoxicillin-clavulanate 875-125 MG per tablet   Commonly known as:  AUGMENTIN   Used for:  Pneumonia of left lower lobe due to infectious organism (H)        Dose:  1 tablet   Take 1 tablet by mouth 2 times daily for 8 days   Quantity:  16 tablet   Refills:  0       azithromycin 250 MG tablet   Commonly known as:  ZITHROMAX   Indication:  Community Acquired Pneumonia   Used for:  Pneumonia of left lower lobe due to infectious organism (H)        Dose:  250 mg   Take 1 tablet (250 mg) by mouth every 24 hours for 3 days   Quantity:  3 tablet   Refills:  0         CONTINUE these medicines which have NOT CHANGED        Dose / Directions    chlorhexidine 0.12 % solution   Commonly known as:  PERIDEX        Take by mouth 2 times daily One oral strip by mouth two times a day for gingivitis.  Swab mouth/gums BID after oral cares.   Refills:  0       nystatin [id number] UNIT/GM Powd   Commonly known as:  MYCOSTATIN        Apply topically daily as needed   Refills:  0       SYNTHROID 75 MCG tablet   Generic drug:  levothyroxine        Dose:  75 mcg   Take 75 mcg by mouth every morning   Refills:  0         STOP taking     HYDROcodone-acetaminophen 5-325 MG per tablet   Commonly known as:  NORCO                Where to get your medicines      Some of these will need a paper prescription and others can be bought over the counter. Ask your nurse if you have questions.     Bring a paper prescription for each of these medications     amoxicillin-clavulanate 875-125 MG per tablet    azithromycin 250 MG tablet                Protect others around you: Learn how to safely use, store and throw away your medicines at www.disposemymeds.org.             Medication List: This is a list of all your medications and when to take them. Check marks below indicate  your daily home schedule. Keep this list as a reference.      Medications           Morning Afternoon Evening Bedtime As Needed    amoxicillin-clavulanate 875-125 MG per tablet   Commonly known as:  AUGMENTIN   Take 1 tablet by mouth 2 times daily for 8 days                                azithromycin 250 MG tablet   Commonly known as:  ZITHROMAX   Take 1 tablet (250 mg) by mouth every 24 hours for 3 days   Last time this was given:  250 mg on 6/28/2017  8:05 AM                                chlorhexidine 0.12 % solution   Commonly known as:  PERIDEX   Take by mouth 2 times daily One oral strip by mouth two times a day for gingivitis.  Swab mouth/gums BID after oral cares.                                nystatin [id number] UNIT/GM Powd   Commonly known as:  MYCOSTATIN   Apply topically daily as needed                                SYNTHROID 75 MCG tablet   Take 75 mcg by mouth every morning   Last time this was given:  75 mcg on 6/28/2017  8:05 AM   Generic drug:  levothyroxine

## 2017-06-26 NOTE — IP AVS SNAPSHOT
"` `           [facility] INTENSIVE CARE: [phone]                                              INTERAGENCY TRANSFER FORM - NURSING   2017                    Hospital Admission Date: 2017  MARVEL BRICE   : 1934  Sex: Female        Attending Provider: Cong Khoury MD     Allergies:  No Known Allergies    Infection:  None   Service:  HOSPITALIST    Ht:  1.651 m (5' 5\")   Wt:  96.5 kg (212 lb 11.9 oz)   Admission Wt:  96.4 kg (212 lb 8.4 oz)    BMI:  35.4 kg/m 2   BSA:  2.1 m 2            Patient PCP Information     Provider PCP Type    TONE Worthy CNP General      Current Code Status     Date Active Code Status Order ID Comments User Context       2017  8:14 AM DNR/DNI [id number]  Margarita Davis PA-C Inpatient       Code Status History     Date Active Date Inactive Code Status Order ID Comments User Context    2016 11:01 AM 2017  8:14 AM DNR/DNI [id number]  Jennifer Serrano APRN CNP Outpatient    3/15/2011  8:49 AM 2016 11:01 AM DNR [id number]  Chelle Carter Outpatient      Advance Directives        Does patient have a scanned Advance Directive/ACP document in EPIC?           Yes        Hospital Problems as of 2017              Priority Class Noted POA    Hypothyroidism   2011 Yes    Alzheimer's disease Medium  2015 Yes    * (Principal)Sepsis (H) Medium  2017 Yes    UTI (urinary tract infection) Medium  2017 Yes    CAP (community acquired pneumonia) Medium  2017 Yes    Elevated lactic acid level Medium  2017 Yes      Non-Hospital Problems as of 2017              Priority Class Noted    Advance Care Planning   2011    Hyperlipidemia LDL goal <130   3/20/2012    Primary osteoarthritis of both knees   2013    Hypertension, benign essential, goal below 140/90 Medium  2015    Annual physical exam due 2017 Medium  2016    Generalized pain Medium  10/16/2016      Immunizations     Name Date      " Influenza (IIV3) 09/23/16     Influenza (IIV3) 10/22/15     Influenza (IIV3) 10/27/14     Pneumococcal (PCV 13) 11/25/15     Pneumococcal 23 valent 08/11/14     Tdap (Adacel,Boostrix) 08/11/14          END      ASSESSMENT     Discharge Profile Flowsheet     EXPECTED DISCHARGE     Communication  3-->unable to speak (not related to language barrier) 06/27/17 1609    Expected Discharge Date  06/28/17 06/26/17 1044   Swallowing  2-->difficulty swallowing liquids (No difficulty with swal;lowing foods on pureed diet) 06/27/17 1609    DISCHARGE NEEDS ASSESSMENT     Current Functional Level Comment  altered level of consciousness 06/26/17 1557    Patient/family verbalizes understanding of discharge plan recommendations?  Yes 06/27/17 0923   Change in Functional Status Since Onset of Current Illness/Injury  yes 06/26/17 1557    Medical Team notified of plan?  yes 06/27/17 0923   GASTROINTESTINAL (ADULT,PEDIATRIC,OB)      Readmission Within The Last 30 Days  no previous admission in last 30 days 06/27/17 0923   GI WDL  WDL 06/28/17 0934    Anticipated Changes Related to Illness  none 06/27/17 0923   Last Bowel Movement  06/28/17 06/28/17 0934    Transportation Available  Medicaid transportation 06/27/17 0923   Passing flatus  no 06/27/17 1603    Key Recommendations  Return to nursing home via medical van 06/27/17 0923   COMMUNICATION ASSESSMENT      Does Patient Need a Referral for Clinic CC  No 06/27/17 0923   Patient's communication style  spoken language (English or Bilingual) 06/26/17 0407    Confirm patient is eligible for [facility] telemonitoring  No - b/c of DX 06/27/17 0923   SKIN      Discussed w/pt use of [facility] telemonitoring and told [facility] staff would call within 72 hours  No 06/27/17 0923   Inspection  Full 06/28/17 0934    Is patient in another telemonitoring program  No 06/27/17 0923   Skin WDL  ex 06/28/17 0934    FUNCTIONAL LEVEL CURRENT     Skin Color/Characteristics  redness blanchable (buttocks) 06/28/17  "0118    Ambulation  4-->completely dependent 06/27/17 1609   Skin Temperature  warm 06/27/17 1603    Transferring  4-->completely dependent 06/27/17 1609   Skin Moisture  moist 06/27/17 1603    Toileting  4-->completely dependent 06/27/17 1609   Skin Integrity  excoriation (buttocks) 06/28/17 0118    Bathing  4-->completely dependent 06/27/17 1609   SAFETY      Dressing  4-->completely dependent 06/27/17 1609   Safety WDL  WDL 06/28/17 0934    Eating  4-->completely dependent 06/27/17 1609                      Assessment WDL (Within Defined Limits) Definitions           Safety WDL     Effective: 09/28/15    Row Information: <b>WDL Definition:</b> Bed in low position, wheels locked; call light in reach; upper side rails up x 2; ID band on<br> <font color=\"gray\"><i>Item=AS safety wdl>>List=AS safety wdl>>Version=F14</i></font>      Skin WDL     Effective: 09/28/15    Row Information: <b>WDL Definition:</b> Warm; dry; intact; elastic; without discoloration; pressure points without redness<br> <font color=\"gray\"><i>Item=AS skin wdl>>List=AS skin wdl>>Version=F14</i></font>      Vitals     Vital Signs Flowsheet     VITAL SIGNS     RESPIRATORY MONITORING      Temp  98.6  F (37  C) 06/28/17 0752   Respiratory Monitoring (EtCO2)  0 mmHg 06/26/17 0755    Temp src  Oral 06/28/17 0752   POSITIONING      Resp  16 06/28/17 0752   Body Position  left, side-lying 06/28/17 0957    Pulse  76 06/27/17 1548   Head of Bed (HOB)  HOB at 20 degrees 06/28/17 0957    Heart Rate  71 06/28/17 0752   Positioning/Transfer Devices  pillows 06/28/17 0957    Pulse/Heart Rate Source  Monitor 06/26/17 0411   Chair  Upright in chair 06/27/17 1356    BP  92/74 06/28/17 0752   DAILY CARE      BP Location  Right arm 06/27/17 1548   Activity Type  bedrest 06/28/17 0957    OXYGEN THERAPY     Activity Level of Assistance  assistance, 2 people 06/28/17 0957    SpO2  94 % 06/28/17 0907   Activity Assistive Device  mechanical lift 06/28/17 0957    O2 Device  " None (Room air) 06/28/17 0907   ECG      FiO2 (%)  95 % 06/27/17 1549   ECG Rhythm  Sinus rhythm 06/28/17 0732    Oxygen Delivery  1 LPM 06/28/17 0752   Ectopy  PAC 06/28/17 0732    PAIN/COMFORT     Ectopy Frequency  Occasional 06/28/17 0732    Preferred Pain Scale  FACES (Rangel-Riley FACES Pain Rating Scale) 06/27/17 2240   AL Interval  0.19 06/28/17 0732    Patient's Stated Pain Goal  Unable to Assess 06/27/17 2240   QRS Interval  0.06 06/28/17 0732    FACES Pain Rating  2-->hurts little bit (with turning etc) 06/28/17 1000   QT Interval  0.39 06/28/17 0732    Nonverbal Indicators Of Pain  grimace 06/28/17 0924   Lead Monitored  Lead II 06/28/17 0732    Pain Management Interventions  pillow support provided 06/28/17 0924   POINT OF CARE TESTING      HEIGHT AND WEIGHT     Puncture Site  fingertip 06/27/17 1650    Weight  96.5 kg (212 lb 11.9 oz) 06/27/17 0700   Bedside Glucose (mg/dl )   133 mg/dl 06/27/17 1650            Patient Lines/Drains/Airways Status    Active LINES/DRAINS/AIRWAYS     Name: Placement date: Placement time: Site: Days: Last dressing change:    Peripheral IV 06/27/17 Left Hand 06/27/17   1628   Hand   less than 1     Wound 06/27/17 Buttocks excoriated 06/27/17   2130   Buttocks   less than 1     Incision/Surgical Site 11/30/12 Mouth 11/30/12   1330    1670             Patient Lines/Drains/Airways Status    Active PICC/CVC     None            Intake/Output Detail Report     Date Intake     Output Net    Shift P.O. I.V. IV Piggyback Total Urine Total       Noc 06/26/17 2300 - 06/27/17 0659 -- -- -- -- 350 350 -350    Day 06/27/17 0700 - 06/27/17 1459 440 -- 50 490 375 375 115    Yvonne 06/27/17 1500 - 06/27/17 2259 300 -- 100 400 155 155 245    Noc 06/27/17 2300 - 06/28/17 0659 -- -- -- -- 200 200 -200    Day 06/28/17 0700 - 06/28/17 1459 120 -- -- 120 358 754 -677      Case Management/Discharge Planning     Case Management/Discharge Planning Flowsheet     REFERRAL INFORMATION     Support  Assessment  Adequate social supports 06/27/17 0923    Admission Type  inpatient 06/27/17 0917   Quality of Family Relationships  non-evident 06/27/17 0923    Arrived From  admitted as an inpatient 06/27/17 0917   COPING/STRESS      Reason For Consult  discharge planning 06/27/17 0917   Major Change/Loss/Stressor  none (per POA) 06/27/17 1243    Record Reviewed  clinical discipline documentation;history and physical;medical record;patient profile;plan of care 06/27/17 0917   EXPECTED DISCHARGE      Primary Care Clinic Name  [facility] 06/27/17 0920   Expected Discharge Date  06/28/17 06/26/17 1044    Primary Care MD Name  STEVE Ceja 06/27/17 0920   DISCHARGE PLANNING      LIVING ENVIRONMENT     Patient/family verbalizes understanding of discharge plan recommendations?  Yes 06/27/17 0923    Lives With  other (see comments) ([facility]) 06/27/17 0920   Medical Team notified of plan?  yes 06/27/17 0923    Living Arrangements  extended care facility 06/27/17 0920   Readmission Within The Last 30 Days  no previous admission in last 30 days 06/27/17 0923    Provides Primary Care For  no one, unable/limited ability to care for self 06/27/17 0920   Anticipated Changes Related to Illness  none 06/27/17 0923    Primary Care Provided By  other (see comments) (nursing home) 06/27/17 0920   Transportation Available  Medicaid transportation 06/27/17 0923    Quality Of Family Relationships  other (see comments) (legal guardian Marlene family uninvolved) 06/27/17 0920   Key Recommendations  Return to nursing home via medical van 06/27/17 0923    Able to Return to Prior Living Arrangements  yes 06/27/17 0920   Does Patient Need a Referral for Clinic CC  No 06/27/17 0923    HOME SAFETY     ABUSE RISK SCREEN      Patient Feels Safe Living in Home?  other (see comments) (unable to assess) 06/27/17 0920   QUESTION TO PATIENT:  Has a member of your family or a partner(now or in the past) intimidated, hurt, manipulated, or  controlled you in any way?  patient declined to answer or is unable to answer 06/26/17 0408    ASSESSMENT OF FAMILY/SOCIAL SUPPORT     QUESTION TO PATIENT: Do you feel safe going back to the place where you are living?  patient declined to answer or is unable to answer 06/26/17 0408    Marital Status  Single 06/27/17 0923   OBSERVATION: Is there reason to believe there has been maltreatment of a vulnerable adult (ie. Physical/Sexual/Emotional abuse, self neglect, lack of adequate food, shelter, medical care, or financial exploitation)?  no 06/26/17 0408    Who is your support system?  Guardian 06/27/17 0923   (R) MENTAL HEALTH SUICIDE RISK      Description of Support System  Supportive;Involved 06/27/17 0923   Are you depressed or being treated for depression?  No 06/27/17 7882

## 2017-06-26 NOTE — PROGRESS NOTES
Assisted staff in transferring pt from ED to ICU7 on BiPAP. Current settings are 10/5, 50%. R10 with pt's SpO2 at 98%. Breath sounds were auscultated bilaterally, with some diminishing/decreased aeration throughout and sparse crackles in the LLL. Continue to check and monitor.    Thank you,  Celestina Kuo, RRT-NPS

## 2017-06-26 NOTE — ED NOTES
Pt moving all limbs and extremities independently in bed, though does not follow commands when asked. Pt does not respond to name or open eyes when asked. Pt slightly restless at this time. Breathing status stable on bipap. No cough noted. BP stable. Tele still with frequent PVC's, bigeminal at times. IV fluids infusing. Will continue to observe.

## 2017-06-26 NOTE — PROGRESS NOTES
"[facility]   Care Update  Date of Service: 6/26/2017     Spoke with the patient's grandson, Hai ([phone]).  He is the only family member [place].  HE was called by the patient's  Annika (her court appointed legal guardian) this morning to discuss his grandmother's cares.  While he is not her decision maker, it seems to this writer that there is some hope (on behalf of the court appointment guardian) that the patient's family will be involved in end of life decisions.    Hai confirmed with this writer that per the wishes of his family (and the orders of the guardian), they will like to continue treatment with IVF and IV antibiotics.  He confirms that they would not decision a PICC, pressors, nor central line for the patient.  He plans to have a few \"conversations\" with family members this evening in regards to possible comfort cares for his grandmother.  Hai plans to be in contact with the patient's legal guardian as she is the decision making body for the patient.      Margarita Davis PA-C    "

## 2017-06-26 NOTE — ED NOTES
Dr. [place] notified of BP trend. Per MD finish remainder of 3L total bolus and continue to observe.

## 2017-06-26 NOTE — PLAN OF CARE
Problem: Goal Outcome Summary  Goal: Goal Outcome Summary  Outcome: Improving  Pt awake with eyes open and more alert at suppertime. Does not verbalize other than incomprehensible sounds when eating thickened broth for supper. Pt does not verbalize or follow simple commands and does not appear to have any understanding of what is communicated to her. Does not turn her head to voice but will look up to voice. Pt ceiling lifted to reclining chair and fed 50% ofa clear liquid diet. Pt chewed and swallowed jello and seemed to have difficulty and with thin liquids with coughing x2. Nectar thickened apple juice and broth. Ate 100% of the juice and few sips of broth as did not seem to like it as this is the only time that pt made audible sounds. Dr. QUINTIN Khoury observed pt eating and was updated to being off bipap since 1110 and that pt swallowed better with nectar thick liquids as no coughing after liquids thickened. No choking at anytime with eating and O2 sats remained at 95 to 97% on O2 at 2 L/NC. Lung sounds are clear and diminished to left lung and diminished with crackles to lower third of left lung. Dr. Khoury stated he would come back to assess the pt. Updated pt's dtr. Cara Андрей to pt's condition after pt's [facility], Marlene Betancourt requested that Nsg update pt's dtr's if they call. Cara stated she would come and see her Mother tomorrow.

## 2017-06-26 NOTE — PROGRESS NOTES
[facility]    Sepsis Evaluation Progress Note    Date of Service: 06/26/2017    I was called to see Nguyen Borja due to persistently elevated lactic acid. She is known to have an infection.     Physical Exam    Vital Signs:  Temp: 100  F (37.8  C) Temp src: Axillary BP: 94/61 Pulse: 101 Heart Rate: 103 Resp: 24 SpO2: 99 % O2 Device: BiPAP/CPAP Oxygen Delivery: 10 LPM    Lab:  Lactic Acid   Date Value Ref Range Status   06/26/2017 5.1 (HH) 0.7 - 2.1 mmol/L Final     Comment:     Critical Value called to and read back by  ANABEL TOSCANO RN [id number] 2017 JTW         The patient has signs of altered level of consciousness suspicious for metabolic encephalopathy secondary to sepsis.    The rest of their physical exam is significant for bibasilar crackles, R>L.    Assessment and Plan    The SIRS and exam findings are likely due to   sepsis.     ID: The patient is currently on the following antibiotics:  Anti-infectives (Future)    Start     Dose/Rate Route Frequency Ordered Stop    06/27/17 0800  azithromycin (ZITHROMAX) tablet 250 mg      250 mg Oral EVERY 24 HOURS 06/26/17 0558 07/01/17 0759    06/26/17 1015  piperacillin-tazobactam (ZOSYN) infusion 3.375 g      3.375 g  100 mL/hr over 30 Minutes Intravenous EVERY 6 HOURS 06/26/17 1002          Current antibiotic coverage is appropriate for source of infection.    Fluid: Fluid bolus not indicated due to unclear direction of patient care.  Per discussion with the patinet's court appointment guardian, she would not desire agressive treatment.  Until a clear direction of care (continue to persue IV antibiotics/IVF versus comfort cares) is obtained, will not persue agressive monitor of the patient's lactic acid. This decision was made in congruence with the patient's legal guardian's wishes.    Lab: Repeat lactic acid is not indicated.    Disposition: The patient will remain on the current unit. We will continue to monitor this patient  closely.    This was discussed in detail with Dr. Cong Khoury.    Margarita Davis PA-C

## 2017-06-26 NOTE — PROGRESS NOTES
Pt pulling at bipap mask and partially removed it. Placed pt on O2 at 4  L/NC at 1123 and O2 sats decreased from 98% to 92 to 96% with RR 17 and nonlabored and lightly snoring. Decreased O2 to 3 L/NC and O2 sat remains at 96%. Pt was more awake for a short time with no response to name but opening eyes spontaneously. Given synthroid po at that time with sips of water. Pt sleeping again with light snoring and no restlessness or fidgeting. NO PVC's at this time with sius rhythm and HR 82 per cardiac monitor.
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
## 2017-06-26 NOTE — ED NOTES
Pt rectal temp assessed. Pt had small griffin incontinent stool. Yumiko care completed prior to alonzo insertion.

## 2017-06-26 NOTE — PROGRESS NOTES
[facility]   Care Update  Date of Service: 6/26/2017     I was called due to elevated blood glucose.  No known history of diabetes.  A1c 6.6%.    Plan:  - medium SSI added on with blood glucose checks    Margarita Davis PA-C

## 2017-06-26 NOTE — PLAN OF CARE
Problem: Goal Outcome Summary  Goal: Goal Outcome Summary  PT Cancel: Patient not medically appropriate for therapies today per charge RN. Will continue to follow and initiate therapy when medically stable/apropriate.

## 2017-06-26 NOTE — ED PROVIDER NOTES
History     Chief Complaint   Patient presents with     Respiratory Distress     HPI  Nguyen Borja is a 83 year old female with past medical history which includes Alzheimer's dementia who presents from local nursing facility for evaluation of increased work of breathing and diminished responsiveness. According to EMS, nursing staff had noted that the patient was having a difficult time breathing this evening and gradually became nonverbal although reactive to physical stimuli. When EMS arrived the patient was hypoxic and remained nonverbal, glucose of 170. Upon arrival to the department the patient is arousable but still nonverbal. Family was unavailable, patient has a DNR/DNI order a transported to the department for evaluation. No other history available.    I have reviewed the Medications, Allergies, Past Medical and Surgical History, and Social History in the Epic system.    Patient Active Problem List   Diagnosis     Hypothyroidism     Advance Care Planning     Hypernatremia     Hyperlipidemia LDL goal <130     Primary osteoarthritis of both knees     Annual physical exam due July 2017     Hypertension, benign essential, goal below 140/90     Alzheimer's disease     Generalized pain       Past Surgical History:   Procedure Laterality Date     EXAM UNDER ANESTHESIA, RESTORATIONS, EXTRACTION(S) DENTAL COMPLEX, COMBINED  11/30/2012    Procedure: COMBINED EXAM UNDER ANESTHESIA, RESTORATIONS, EXTRACTION(S) DENTAL COMPLEX;  Bilateral Dental Exam, Radiographs, Dental Restorations, Periodontal Therapy, Dental Extractions X 6 ,gingivectomy. ;  Surgeon: Miguel Acharya DDS;  Location: [facility] OR     EXAM UNDER ANESTHESIA, RESTORATIONS, EXTRACTION(S) DENTAL, COMBINED  12/4/2013    Procedure: COMBINED EXAM UNDER ANESTHESIA, RESTORATIONS, EXTRACTION(S) DENTAL;  Dental Exam, Radiographs, Restorations, Periodontal Therapy,Fluoride therapy  ;  Surgeon: Miguel Acharya DDS;  Location: [facility] OR       Social History      Social History     Marital status: Single     Spouse name: N/A     Number of children: N/A     Years of education: N/A     Occupational History      Retired     Social History Main Topics     Smoking status: Never Smoker     Smokeless tobacco: Not on file     Alcohol use No     Drug use: No     Sexual activity: Not on file     Other Topics Concern     Not on file     Social History Narrative       No family history on file.    Most Recent Immunizations   Administered Date(s) Administered     Influenza (IIV3) 09/23/2016     Pneumococcal (PCV 13) 11/25/2015     Pneumococcal 23 valent 08/11/2014     Tdap (Adacel,Boostrix) 08/11/2014         Review of Systems unable to obtain secondary to clinical condition...    Physical Exam   BP: 139/85  Heart Rate: 116  Temp: 102.7  F (39.3  C)  SpO2: 99 %  Physical Exam  Constitutional: Well developed, well nourished. Mildly diaphoretic, arousable only to physical stimuli.  Head: Normocephalic and atraumatic. Symmetric in appearance.  Eyes: Conjunctivae are normal. PERRLA.  Neck: Neck supple.  Cardiovascular: No cyanosis. Tachycardia with regular rhythm. No audible murmurs noted. No lower extremity edema or asymmetry.   Respiratory: Mild tachypnea without accessory muscle usage. Mild diffuse wheezing with rhonchi bilaterally.  Gastrointestinal: Soft, nondistended abdomen. Nontender and without guarding. No rigidity or rebound tenderness. Negative McBurney's point. Negative for Burks's sign. No palpable pulsatile mass.  Musculoskeletal: Moves all extremities when prompted with physical stimuli.   Neuro: Patient is arousable but nonverbal.  Skin: Skin is warm and moist, no identifiable rashes or skin lesions.       ED Course     ED Course     Procedures              Critical Care time:  was 35 minutes for this patient excluding procedures.  Critical Care Addendum    My initial assessment, based on my review of nursing observations, review of vital signs, focused  history, physical exam, review of cardiac rhythm monitor and 12 lead ECG analysis, established that Nguyen Borja has respiratory insufficiency and altered mental status, which requires immediate intervention, and therefore She is critically ill.     After the initial assessment, the care team initiated multiple lab tests, initiated IV fluid administration, initiated medication therapy with IV fluids and ABX and initiated intensive non-invasive respiratory support to provide stabilization care. Due to the critical nature of this patient, I reassessed nursing observations, vital signs, physical exam, review of cardiac rhythm monitor, mental status and respiratory status multiple times prior to She disposition.     Time also spent performing documentation, reviewing test results and coordination of care.     Critical care time (excluding teaching time and procedures): 35 minutes.     The patient has signs of Severe Sepsis as evidenced by:    1. 2 SIRS criteria, AND  2. Suspected infection, AND   3. Organ dysfunction: Lactic Acid >2    Time severe sepsis diagnosis confirmed = 50min. as this was the time when Lactate resulted, and the level was >2       3 Hour Severe Sepsis Bundle Completion:  1. Initial Lactic Acid Result:   Recent Labs   Lab Test  06/26/17   0435   LACT  5.2*     2. Blood Cultures before Antibiotics: Yes  3. Broad Spectrum Antibiotics Administered: Yes     Anti-infectives (Future)    Start     Dose/Rate Route Frequency Ordered Stop    06/27/17 0800  azithromycin (ZITHROMAX) tablet 250 mg      250 mg Oral EVERY 24 HOURS 06/26/17 0558 07/01/17 0759    06/27/17 0600  cefTRIAXone (ROCEPHIN) 2 g vial to attach to  ml bag for ADULTS or NS 50 ml bag for PEDS      2 g  over 30 Minutes Intravenous EVERY 24 HOURS 06/26/17 0606      06/26/17 0559  azithromycin (ZITHROMAX) 500 mg in NaCl 0.9 % 250 mL intermittent infusion      500 mg  over 1 Hours Intravenous EVERY 24 HOURS 06/26/17 0558 06/27/17 0558         4. 3000 ml of IV fluids.    the patient was transferred out of the ED prior to the 6 hour sam.          Results for orders placed or performed during the hospital encounter of 06/26/17 (from the past 24 hour(s))   CBC with platelets differential   Result Value Ref Range    WBC 17.3 (H) 4.0 - 11.0 10e9/L    RBC Count 5.21 (H) 3.8 - 5.2 10e12/L    Hemoglobin 13.7 11.7 - 15.7 g/dL    Hematocrit 44.3 35.0 - 47.0 %    MCV 85 78 - 100 fl    MCH 26.3 (L) 26.5 - 33.0 pg    MCHC 30.9 (L) 31.5 - 36.5 g/dL    RDW 17.2 (H) 10.0 - 15.0 %    Platelet Count 316 150 - 450 10e9/L    Diff Method Automated Method     % Neutrophils 94.0 %    % Lymphocytes 4.1 %    % Monocytes 1.0 %    % Eosinophils 0.1 %    % Basophils 0.2 %    % Immature Granulocytes 0.6 %    Absolute Neutrophil 16.2 (H) 1.6 - 8.3 10e9/L    Absolute Lymphocytes 0.7 (L) 0.8 - 5.3 10e9/L    Absolute Monocytes 0.2 0.0 - 1.3 10e9/L    Absolute Eosinophils 0.0 0.0 - 0.7 10e9/L    Absolute Basophils 0.0 0.0 - 0.2 10e9/L    Abs Immature Granulocytes 0.1 0 - 0.4 10e9/L   Comprehensive metabolic panel   Result Value Ref Range    Sodium 141 133 - 144 mmol/L    Potassium 4.2 3.4 - 5.3 mmol/L    Chloride 108 94 - 109 mmol/L    Carbon Dioxide 21 20 - 32 mmol/L    Anion Gap 12 3 - 14 mmol/L    Glucose 157 (H) 70 - 99 mg/dL    Urea Nitrogen 15 7 - 30 mg/dL    Creatinine 0.89 0.52 - 1.04 mg/dL    GFR Estimate 61 >60 mL/min/1.7m2    GFR Estimate If Black 74 >60 mL/min/1.7m2    Calcium 9.2 8.5 - 10.1 mg/dL    Bilirubin Total 0.9 0.2 - 1.3 mg/dL    Albumin 3.6 3.4 - 5.0 g/dL    Protein Total 7.9 6.8 - 8.8 g/dL    Alkaline Phosphatase 80 40 - 150 U/L    ALT 31 0 - 50 U/L    AST 22 0 - 45 U/L   INR   Result Value Ref Range    INR 0.98 0.86 - 1.14   Troponin I   Result Value Ref Range    Troponin I ES 0.027 0.000 - 0.045 ug/L   Lactic acid whole blood   Result Value Ref Range    Lactic Acid 5.2 (HH) 0.7 - 2.1 mmol/L   Blood gas venous   Result Value Ref Range    Ph Venous 7.32 7.32 -  7.43 pH    PCO2 Venous 46 40 - 50 mm Hg    PO2 Venous 27 25 - 47 mm Hg    Bicarbonate Venous 24 21 - 28 mmol/L    Base Deficit Venous 2.2 mmol/L    FIO2 HIDE    UA with Microscopic   Result Value Ref Range    Color Urine Yellow     Appearance Urine Slightly Cloudy     Glucose Urine Negative NEG mg/dL    Bilirubin Urine Negative NEG    Ketones Urine 5 (A) NEG mg/dL    Specific Gravity Urine 1.010 1.003 - 1.035    Blood Urine Trace (A) NEG    pH Urine 5.5 5.0 - 7.0 pH    Protein Albumin Urine 10 (A) NEG mg/dL    Urobilinogen mg/dL Normal 0.0 - 2.0 mg/dL    Nitrite Urine Positive (A) NEG    Leukocyte Esterase Urine Large (A) NEG    Source Catheterized Urine     WBC Urine 53 (H) 0 - 2 /HPF    RBC Urine 31 (H) 0 - 2 /HPF    WBC Clumps Present (A) NEG /HPF    Squamous Epithelial /HPF Urine 5 (H) 0 - 1 /HPF    Mucous Urine Present (A) NEG /LPF   XR Chest Port 1 View    Narrative    XR CHEST PORT 1 VIEW  6/26/2017 5:42 AM      HISTORY: AMS, fever.     COMPARISON: None.    FINDINGS: Semi-upright portable chest. The heart is at the upper  limits of normal in size without pulmonary edema. Thoracic aorta is  calcified and mildly tortuous. Curvilinear scar at the left lung base.  Small calcified granuloma in the left upper lobe laterally. The lungs  are otherwise clear. The right hemidiaphragm is elevated. No  pneumothorax.      Impression    IMPRESSION: No acute abnormality.    LISA PRINCE MD         Assessments & Plan (with Medical Decision Making)   Nguyen Borja is a 83 year old female who presented to the department for evaluation of increased work of breathing and diminished responsiveness from local nursing facility. She arrived febrile and in respiratory failure, seemed to tolerate BiPAP well however. Lung sounds are rhonchorous bilaterally and chest radiograph reveals left lower lobe infiltrate. Urinalysis also positive for nitrate and leukocyte esterase. Patient's white count with significant leukocytosis and  lactate of >5. She received IV fluid resuscitation and ceftriaxone shortly after arrival, followed by azithromycin after appearance of pulmonary infiltrate for coverage of community acquired pneumonia. She became more alert and looking around the room with BiPAP.    The patient will require admission for resuscitation of septic infection likely due to pneumonia. Consulted hospitalist Dr. Romano regarding patient presentation and workup thus far. He agrees the plan for ICU admission while continuing noninvasive positive pressure ventilation, also agrees with IV antibiotic selection, no further recommendations. Temporary transition orders were placed per protocol.    The patient has been informed of her results and the recommendation for admission, although uncertain how much she understood.      Disclaimer: This note consists of symbols derived from keyboarding, dictation, and/or voice recognition software. As a result, there may be errors in the script that have gone undetected.  Please consider this when interpreting information found in the chart.        I have reviewed the nursing notes.    I have reviewed the findings, diagnosis, plan and need for follow up with the patient.      New Prescriptions    No medications on file       Final diagnoses:   Pneumonia of left lower lobe due to infectious organism (H)   Urinary tract infection, site unspecified   Altered mental status, unspecified altered mental status type   Acute respiratory failure with hypoxia (H)       6/26/2017   [facility] EMERGENCY DEPARTMENT     Meet [place] DO  06/26/17 0656

## 2017-06-26 NOTE — PROGRESS NOTES
[facility] ADMISSION NOTE    Patient admitted to room ICU 1007 at approximately 0740via cart from emergency room. Patient was accompanied by other:staff, Nsg x2 and RT.     Verbal SBAR report received from Vitaly prior to patient arrival.     Patient trasferred to bed via air duane. Patient alert and responds to touch by briefly opening eyes and brief change in facial expression. The patient is not having any pain as no moaning, facial grimacing or restlessness noted.  . Admission vital signs: Blood pressure 94/61, temperature 102.3  F (39.1  C), temperature source Axillary, resp. rate 20, SpO2 99 %. Patient was oriented to plan of care, call light, bed controls, tv, telephone, bathroom and visiting hours.     The following safety risks were identified during admission: fall, aspiration and isolation. Yellow risk band applied: YES.     Sangeetha Rice

## 2017-06-26 NOTE — IP AVS SNAPSHOT
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              Medications        azithromycin  250 mg Oral Q24H     levothyroxine  75 mcg Oral QAM AC     enoxaparin  40 mg Subcutaneous Q24H     piperacillin-tazobactam  3.375 g Intravenous Q6H     ipratropium - albuterol 0.5 mg/2.5 mg/3 mL  3 mL Nebulization Q4H While awake     insulin aspart  1-7 Units Subcutaneous TID AC     insulin aspart  1-5 Units Subcutaneous At Bedtime[RK1.2]             Cong BUCKNER  Hospitalist - Internal Medicine  [facility][RK1.1]         Revision History        User Key Date/Time User Provider Type Action    > RK1.3 6/27/2017  5:23 PM Cong Khoury MD Physician Sign     RK1.2 6/27/2017  9:00 AM Cong Khoury MD Physician      RK1.1 6/27/2017  8:59 AM Cong Khoury MD Physician             Progress Notes by Sangeetha Rice RN at 6/27/2017  3:06 PM     Author:  Sangeetha Rice, RN Service:  (none) Author Type:   Registered Nurse    Filed:  6/27/2017  3:07 PM Date of Service:  6/27/2017  3:06 PM Creation Time:  6/27/2017  3:06 PM    Status:  Signed :  Sangeetha Rice RN (Registered Nurse)         Ana Luisa from [facility] called re:blood culture results showing E-coli. Web paged Dr. Cong Khoury.[CM1.1]     Revision History        User Key Date/Time User Provider Type Action    > CM1.1 6/27/2017  3:07 PM Sangeetha Rice RN Registered Nurse Sign            Progress Notes by Zhane Osorio RN at 6/27/2017  6:30 AM     Author:  Zhane Osorio RN Service:  (none) Author Type:  Registered Nurse    Filed:  6/27/2017  7:04 AM Date of Service:  6/27/2017  6:30 AM Creation Time:  6/27/2017  7:01 AM    Status:  Signed :  Zhane Osorio RN (Registered Nurse)         Pt continues on 2 liters NC O2, sats 91-94%. Pt refuses oral cares or liquids by clamping her mouth shut when offered. AM labs drawn and sent to lab, await results.[SC1.1]     Revision History        User Key Date/Time User Provider Type Action    > SC1.1 6/27/2017  7:04 AM Zhane Osorio RN Registered Nurse Sign            Progress Notes by Zhane Osorio RN at 6/27/2017  3:00 AM     Author:  Zhane Osorio RN Service:  (none) Author Type:  Registered Nurse    Filed:  6/27/2017  3:45 AM Date of Service:  6/27/2017  3:00 AM Creation Time:  6/27/2017  3:28 AM    Status:  Signed :  Zhane Osorio RN (Registered Nurse)         Pt incontinent of very large soft, loose, smelly BM.Cares given and linens changed.BIPAP removed and pt placed on 2 Liters O2 per NC.[SC1.1]     Revision History        User Key Date/Time User Provider Type Action    > SC1.1 6/27/2017  3:45 AM Zhane Osorio RN Registered Nurse Sign            Progress Notes by Zhane Osorio RN at 6/26/2017 11:13 PM     Author:  Zhane Osorio RN Service:  (none) Author Type:  Registered Nurse    Filed:  6/27/2017 12:47 AM Date of Service:  6/26/2017 11:13 PM Creation Time:  6/27/2017 12:43  AM    Status:  Signed :  Zhane Osorio RN (Registered Nurse)         RT feeling pt should be on BIPAP, so it was applied at 60% 12/5. Pt sats variable, finger probe moved to toe and readings are more steady and favorable. Will continue to monitor.[SC1.1]     Revision History        User Key Date/Time User Provider Type Action    > SC1.1 6/27/2017 12:47 AM Zhane Osorio RN Registered Nurse Sign            Progress Notes by Zhane Osorio RN at 6/26/2017  9:30 PM     Author:  Zhane Osorio RN Service:  (none) Author Type:  Registered Nurse    Filed:  6/26/2017 10:54 PM Date of Service:  6/26/2017  9:30 PM Creation Time:  6/26/2017 10:50 PM    Status:  Signed :  Zhane Osorio RN (Registered Nurse)         VSS. Pt lifted with ceiling lift back to bed and repositioned on right side.  Pt continues nonverbal and confused as is her norm with advanced alzheimer's. Accucheck 144. IV and alonzo intact and patent. Will continue to monitor.[SC1.1]     Revision History        User Key Date/Time User Provider Type Action    > SC1.1 6/26/2017 10:54 PM Zhane Osorio RN Registered Nurse Sign            Progress Notes by Sangeetha Rice RN at 6/26/2017  1:25 PM     Author:  Sangeetha Rice RN Service:  (none) Author Type:  Registered Nurse    Filed:  6/26/2017  4:56 PM Date of Service:  6/26/2017  1:25 PM Creation Time:  6/26/2017  1:25 PM    Status:  Addendum :  Sangeetha Rice RN (Registered Nurse)         Web paged Margarita HAM updating her to pt on O2 per NC and tolerating well and  and 18[CM1.1]7[CM1.2] this shift and asked if would like insulin coverage.[CM1.1] HGB A1C ordered.[CM1.3]     Revision History        User Key Date/Time User Provider Type Action    > CM1.2 6/26/2017  4:56 PM Sangeetha Rice RN Registered Nurse Addend     CM1.3 6/26/2017  1:29 PM Sangeetha Rice, RN Registered Nurse Addend     CM1.1 6/26/2017  1:28 PM Sangeetha Rice, RN Registered Nurse Sign             Progress Notes by Sangeetha Rice RN at 6/26/2017  9:31 AM     Author:  Sangeetha Rice RN Service:  (none) Author Type:  Registered Nurse    Filed:  6/26/2017  4:22 PM Date of Service:  6/26/2017  9:31 AM Creation Time:  6/26/2017  9:31 AM    Status:  Addendum :  Sangeetha Rice RN (Registered Nurse)         Margarita GONZALEZ pt's legal guardian,[CM1.1] Marlene Betancourt[CM1.2] via telephone.[CM1.1] Gave POLST to PA.[CM1.3]     Revision History        User Key Date/Time User Provider Type Action    > CM1.2 6/26/2017  4:22 PM Sangeetha Rice RN Registered Nurse Addend     [N/A] 6/26/2017  9:36 AM Sangeetha Rice RN Registered Nurse Addend     CM1.3 6/26/2017  9:36 AM Sangeetha Rice RN Registered Nurse Sign     CM1.1 6/26/2017  9:31 AM Sangeetha Rice RN Registered Nurse             Progress Notes by Margartia Davis PA-C at 6/26/2017  3:19 PM     Author:  Margarita Davis PA-C Service:  Internal Medicine Author Type:  Physician Assistant - C    Filed:  6/26/2017  3:24 PM Date of Service:  6/26/2017  3:19 PM Creation Time:  6/26/2017  3:19 PM    Status:  Signed :  Margarita Davis PA-C (Physician Assistant - C)         [facility]    Hospital Medicine   Care Update  Date of Service: 6/26/2017     Spoke with the patient's grandson, Hai ([phone]).  He is the only family member [place].  HE was called by the patient's  Annika (her court appointed legal guardian) this morning to discuss his grandmother's cares.  While he is not her decision maker, it seems to this writer that there is some hope (on behalf of the court appointment guardian) that the patient's family will be involved in end of life decisions.    Hai confirmed with this writer that per the wishes of his family (and the orders of the guardian), they will like to continue treatment with IVF and IV antibiotics.  He confirms that they would not  "decision a PICC, pressors, nor central line for the patient.  He plans to have a few \"conversations\" with family members this evening in regards to possible comfort cares for his grandmother.  Hai plans to be in contact with the patient's legal guardian as she is the decision making body for the patient.      Margarita Davis PA-C[CH1.1]       Revision History        User Key Date/Time User Provider Type Action    > CH1.1 6/26/2017  3:24 PM Margarita Davis PA-C Physician Assistant - RYANN Sign            Progress Notes by Margarita Davis PA-C at 6/26/2017  2:37 PM     Author:  Margarita Davis PA-C Service:  Internal Medicine Author Type:  Physician Assistant Dirk GARZON    Filed:  6/26/2017  2:38 PM Date of Service:  6/26/2017  2:37 PM Creation Time:  6/26/2017  2:37 PM    Status:  Signed :  Margarita Davis PA-C (Physician Assistant - RYANN)         [facility] Medicine   Care Update  Date of Service: 6/26/2017     I was called due to elevated blood glucose.  No known history of diabetes.  A1c 6.6%.    Plan:  - medium SSI added on with blood glucose checks    Margarita Davis PA-C[CH1.1]       Revision History        User Key Date/Time User Provider Type Action    > CH1.1 6/26/2017  2:38 PM Margarita Davis PA-C Physician Assistant - RYANN Sign            Progress Notes by Sangeetha Rice RN at 6/26/2017 11:58 AM     Author:  Sangeetha Rice RN Service:  (none) Author Type:  Registered Nurse    Filed:  6/26/2017 12:06 PM Date of Service:  6/26/2017 11:58 AM Creation Time:  6/26/2017 11:58 AM    Status:  Signed :  Sangeetha Rice RN (Registered Nurse)         Pt pulling at bipap mask and partially removed it. Placed pt on O2 at 4  L/NC at 1123 and O2 sats decreased from 98% to 92 to 96% with RR 17 and nonlabored and lightly snoring. Decreased O2 to 3 L/NC and O2 sat remains at 96%. Pt was more awake for a short time with no response to name but opening " "eyes spontaneously. Given synthroid po at that time with sips of water. Pt sleeping again with light snoring and no restlessness or fidgeting. NO PVC's at this time with sius rhythm and HR 82 per cardiac monitor.[CM1.1]      Revision History        User Key Date/Time User Provider Type Action    > CM1.1 6/26/2017 12:06 PM Sangeetha Rice, RN Registered Nurse Sign            Progress Notes by Cosme Garcia at 6/26/2017 10:38 AM     Author:  Cosme Garcia Service:  Spiritual Health Author Type:      Filed:  6/26/2017 10:39 AM Date of Service:  6/26/2017 10:38 AM Creation Time:  6/26/2017 10:38 AM    Status:  Signed :  Cosme Garcia ()         SPIRITUAL HEALTH SERVICES  SPIRITUAL ASSESSMENT Progress Note   Hospital Location [place]    Consult with Staff. Was informed that Nguyen \"may die\" briefly visited and prayed for her.                                                                                                                                            Cosme Garcia MA.  Associate Staff   Pager [phone][AB1.1]       Revision History        User Key Date/Time User Provider Type Action    > AB1.1 6/26/2017 10:39 AM Cosme Garcia Sign            Progress Notes by Margarita Davis PA-C at 6/26/2017 10:03 AM     Author:  Margarita Davis PA-C Service:  Internal Medicine Author Type:  Physician Assistant - C    Filed:  6/26/2017 10:06 AM Date of Service:  6/26/2017 10:03 AM Creation Time:  6/26/2017 10:03 AM    Status:  Signed :  Margarita Davis PA-C (Physician Assistant - C)         [facility]    Sepsis Evaluation Progress Note    Date of Service:[CH1.1] 06/26/2017[CH1.2]    I was called to see Nguyen Borja due to persistently elevated lactic acid. She is known to have an infection.     Physical Exam    Vital Signs:[CH1.1]  Temp: 100  F (37.8  C) Temp src: Axillary BP: 94/61 Pulse: 101 Heart Rate: 103 Resp: 24 SpO2: 99 % O2 " Device: BiPAP/CPAP Oxygen Delivery: 10 LPM[CH1.2]    Lab:[CH1.1]  Lactic Acid   Date Value Ref Range Status   06/26/2017 5.1 (HH) 0.7 - 2.1 mmol/L Final     Comment:     Critical Value called to and read back by  ANABEL TOSCANO RN 0904 2017 JTW[CH1.2]         The patient has signs of altered level of consciousness suspicious for metabolic encephalopathy secondary to sepsis.    The rest of their physical exam is significant for bibasilar crackles, R>L.    Assessment and Plan    The SIRS and exam findings are likely due to   sepsis.     ID: The patient is currently on the following antibiotics:[CH1.1]  Anti-infectives (Future)    Start     Dose/Rate Route Frequency Ordered Stop    06/27/17 0800  azithromycin (ZITHROMAX) tablet 250 mg      250 mg Oral EVERY 24 HOURS 06/26/17 0558 07/01/17 0759    06/26/17 1015  piperacillin-tazobactam (ZOSYN) infusion 3.375 g      3.375 g  100 mL/hr over 30 Minutes Intravenous EVERY 6 HOURS 06/26/17 1002[CH1.2]          Current antibiotic coverage is appropriate for source of infection.    Fluid: Fluid bolus not indicated due to unclear direction of patient care.  Per discussion with the patinet's court appointment guardian, she would not desire agressive treatment.  Until a clear direction of care (continue to persue IV antibiotics/IVF versus comfort cares) is obtained, will not persue agressive monitor of the patient's lactic acid. This decision was made in congruence with the patient's legal guardian's wishes.    Lab: Repeat lactic acid is not indicated.    Disposition: The patient will remain on the current unit. We will continue to monitor this patient closely.    This was discussed in detail with Dr. Cong Khoury.[CH1.1]    Margarita Davis PA-C[CH1.2]         Revision History        User Key Date/Time User Provider Type Action    > CH1.2 6/26/2017 10:06 AM Margarita Davis PA-C Physician Assistant - C Sign     CH1.1 6/26/2017 10:03 AM Margarita Davis PA-C Physician  Assistant - C             Progress Notes by Sangeetha Rice RN at 6/26/2017  7:40 AM     Author:  Sangeetha Rice RN Service:  (none) Author Type:  Registered Nurse    Filed:  6/26/2017  8:29 AM Date of Service:  6/26/2017  7:40 AM Creation Time:  6/26/2017  8:18 AM    Status:  Signed :  Sangeetha Rice RN (Registered Nurse)         [facility] ADMISSION NOTE    Patient admitted to room ICU 1007 at approximately 0740via cart from emergency room. Patient was accompanied by other:staff, Ns x2 and RT.     Verbal SBAR report received from Vitaly prior to patient arrival.     Patient trasferred to bed via air duane. Patient alert and responds to touch by briefly opening eyes and brief change in facial expression. The patient is not having any pain as no moaning, facial grimacing or restlessness noted.  . Admission vital signs: Blood pressure 94/61, temperature 102.3  F (39.1  C), temperature source Axillary, resp. rate 20, SpO2 99 %. Patient was oriented to plan of care, call light, bed controls, tv, telephone, bathroom and visiting hours.     The following safety risks were identified during admission: fall, aspiration and isolation. Yellow risk band applied: YES.     Sangeetha Rice[CM1.1]       Revision History        User Key Date/Time User Provider Type Action    > CM1.1 6/26/2017  8:29 AM Sangeetha Rice RN Registered Nurse Sign            Progress Notes by Celestina Kuo RT at 6/26/2017  7:49 AM     Author:  Celestina Kuo RT Service:  Respiratory Therapy Author Type:  Respiratory Therapist    Filed:  6/26/2017  7:52 AM Date of Service:  6/26/2017  7:49 AM Creation Time:  6/26/2017  7:49 AM    Status:  Signed :  Celestina Kuo RT (Respiratory Therapist)         Assisted staff in transferring pt from ED to ICU7 on BiPAP. Current settings are 10/5, 50%. R10 with pt's SpO2 at 98%. Breath sounds were auscultated bilaterally, with some diminishing/decreased aeration  throughout and sparse crackles in the LLL. Continue to check and monitor.    Thank you,  Celestina Kuo, RRT-NPS[NC1.1]     Revision History        User Key Date/Time User Provider Type Action    > NC1.1 6/26/2017  7:52 AM Celestina Kuo, RT Respiratory Therapist Sign            ED Notes by aJlyn Hernandez RN at 6/26/2017  7:14 AM     Author:  Jalyn Hernandez RN Service:  (none) Author Type:  Registered Nurse    Filed:  6/26/2017  7:20 AM Date of Service:  6/26/2017  7:14 AM Creation Time:  6/26/2017  7:14 AM    Status:  Signed :  Jalyn Hernandez RN (Registered Nurse)         Dr. Boston notified of BP trend. Per MD finish remainder of 3L total bolus and continue to observe.[LJ1.1]      Revision History        User Key Date/Time User Provider Type Action    > LJ1.1 6/26/2017  7:20 AM Jalyn Hernandez RN Registered Nurse Sign            ED Provider Notes by Meet Boston DO at 6/26/2017  3:31 AM     Author:  Meet Boston DO Service:  Emergency Medicine Author Type:  Physician    Filed:  6/26/2017  6:56 AM Date of Service:  6/26/2017  3:31 AM Creation Time:  6/26/2017  4:11 AM    Status:  Signed :  Meet Boston DO (Physician)           History[TS1.1]     Chief Complaint   Patient presents with     Respiratory Distress[TS1.2]     HPI  Nguyen Borja is a 83 year old female with past medical history which includes[TS1.1] Alzheimer's dementia who presents from local nursing facility for evaluation of increased work of breathing and diminished responsiveness. According to EMS, nursing staff had noted that the patient was having a difficult time breathing this evening and gradually became nonverbal although reactive to physical stimuli. When EMS arrived the patient was hypoxic and remained nonverbal, glucose of 170. Upon arrival to the department the patient is arousable but still nonverbal. Family was unavailable, patient has a DNR/DNI order a transported to the department for evaluation. No other  history available.[TS1.3]    I have reviewed the Medications, Allergies, Past Medical and Surgical History, and Social History in the Epic system.[TS1.1]    Patient Active Problem List   Diagnosis     Hypothyroidism     Advance Care Planning     Hypernatremia     Hyperlipidemia LDL goal <130     Primary osteoarthritis of both knees     Annual physical exam due July 2017     Hypertension, benign essential, goal below 140/90     Alzheimer's disease     Generalized pain       Past Surgical History:   Procedure Laterality Date     EXAM UNDER ANESTHESIA, RESTORATIONS, EXTRACTION(S) DENTAL COMPLEX, COMBINED  11/30/2012    Procedure: COMBINED EXAM UNDER ANESTHESIA, RESTORATIONS, EXTRACTION(S) DENTAL COMPLEX;  Bilateral Dental Exam, Radiographs, Dental Restorations, Periodontal Therapy, Dental Extractions X 6 ,gingivectomy. ;  Surgeon: Miguel Acharya DDS;  Location: [facility] OR     EXAM UNDER ANESTHESIA, RESTORATIONS, EXTRACTION(S) DENTAL, COMBINED  12/4/2013    Procedure: COMBINED EXAM UNDER ANESTHESIA, RESTORATIONS, EXTRACTION(S) DENTAL;  Dental Exam, Radiographs, Restorations, Periodontal Therapy,Fluoride therapy  ;  Surgeon: Miguel Acharya DDS;  Location: [facility] OR       Social History     Social History     Marital status: Single     Spouse name: N/A     Number of children: N/A     Years of education: N/A     Occupational History      Retired     Social History Main Topics     Smoking status: Never Smoker     Smokeless tobacco: Not on file     Alcohol use No     Drug use: No     Sexual activity: Not on file     Other Topics Concern     Not on file     Social History Narrative       No family history on file.    Most Recent Immunizations   Administered Date(s) Administered     Influenza (IIV3) 09/23/2016     Pneumococcal (PCV 13) 11/25/2015     Pneumococcal 23 valent 08/11/2014     Tdap (Adacel,Boostrix) 08/11/2014[TS1.2]         Review of Systems[TS1.1] unable to obtain secondary to clinical  condition...[TS1.3]    Physical Exam   BP: 139/85  Heart Rate: 116  Temp: 102.7  F (39.3  C)  SpO2: 99 %  Physical Exam[TS1.1]  Constitutional: Well developed, well nourished.[TS1.3] Mildly diaphoretic, arousable only to physical stimuli.[TS1.4]  Head: Normocephalic and atraumatic. Symmetric in appearance.  Eyes: Conjunctivae are normal.[TS1.3] PERRLA.[TS1.4]  Neck: Neck supple.  Cardiovascular: No cyanosis.[TS1.3] Tachycardia with regular rhythm[TS1.4]. No audible murmurs noted. No lower extremity edema or asymmetry.   Respiratory:[TS1.3] Mild tachypnea without accessory muscle usage. Mild diffuse wheezing with rhonchi bilaterally.[TS1.4]  Gastrointestinal: Soft, nondistended abdomen. Nontender and without guarding. No rigidity or rebound tenderness. Negative McBurney's point. Negative for Burks's sign. No palpable pulsatile mass.  Musculoskeletal: Moves all extremities[TS1.3] when prompted with physical stimuli.[TS1.4]   Neuro: Patient is[TS1.3] arousable but nonverbal.[TS1.4]  Skin: Skin is warm[TS1.3] and moist, no identifiable rashes or skin lesions.[TS1.4]       ED Course[TS1.1]     ED Course[TS1.2]     Procedures              Critical Care time:[TS1.1]  was 35 minutes for this patient excluding procedures.  Critical Care Addendum    My initial assessment, based on my review of nursing observations, review of vital signs, focused history, physical exam, review of cardiac rhythm monitor and 12 lead ECG analysis, established that Nguyen Borja has respiratory insufficiency and altered mental status, which requires immediate intervention, and therefore She is critically ill.     After the initial assessment, the care team initiated multiple lab tests, initiated IV fluid administration, initiated medication therapy with IV fluids and ABX and initiated intensive non-invasive respiratory support to provide stabilization care. Due to the critical nature of this patient, I reassessed nursing observations, vital  signs, physical exam, review of cardiac rhythm monitor, mental status and respiratory status multiple times prior to She disposition.     Time also spent performing documentation, reviewing test results and coordination of care.     Critical care time (excluding teaching time and procedures): 35 minutes.     The patient has signs of Severe Sepsis as evidenced by:    1. 2 SIRS criteria, AND  2. Suspected infection, AND   3. Organ dysfunction: Lactic Acid >2    Time severe sepsis diagnosis confirmed = 50min. as this was the time when Lactate resulted, and the level was >2       3 Hour Severe Sepsis Bundle Completion:  1. Initial Lactic Acid Result:[TS1.5]   Recent Labs   Lab Test  06/26/17   0435   LACT  5.2*[TS1.2]     2. Blood Cultures before Antibiotics: Yes  3. Broad Spectrum Antibiotics Administered: Yes[TS1.5]     Anti-infectives (Future)    Start     Dose/Rate Route Frequency Ordered Stop    06/27/17 0800  azithromycin (ZITHROMAX) tablet 250 mg      250 mg Oral EVERY 24 HOURS 06/26/17 0558 07/01/17 0759    06/27/17 0600  cefTRIAXone (ROCEPHIN) 2 g vial to attach to  ml bag for ADULTS or NS 50 ml bag for PEDS      2 g  over 30 Minutes Intravenous EVERY 24 HOURS 06/26/17 0606      06/26/17 0559  azithromycin (ZITHROMAX) 500 mg in NaCl 0.9 % 250 mL intermittent infusion      500 mg  over 1 Hours Intravenous EVERY 24 HOURS 06/26/17 0558 06/27/17 0558[TS1.2]        4. 3000 ml of IV fluids.    the patient was transferred out of the ED prior to the 6 hour sam.[TS1.5]          Results for orders placed or performed during the hospital encounter of 06/26/17 (from the past 24 hour(s))   CBC with platelets differential   Result Value Ref Range    WBC 17.3 (H) 4.0 - 11.0 10e9/L    RBC Count 5.21 (H) 3.8 - 5.2 10e12/L    Hemoglobin 13.7 11.7 - 15.7 g/dL    Hematocrit 44.3 35.0 - 47.0 %    MCV 85 78 - 100 fl    MCH 26.3 (L) 26.5 - 33.0 pg    MCHC 30.9 (L) 31.5 - 36.5 g/dL    RDW 17.2 (H) 10.0 - 15.0 %    Platelet  Count 316 150 - 450 10e9/L    Diff Method Automated Method     % Neutrophils 94.0 %    % Lymphocytes 4.1 %    % Monocytes 1.0 %    % Eosinophils 0.1 %    % Basophils 0.2 %    % Immature Granulocytes 0.6 %    Absolute Neutrophil 16.2 (H) 1.6 - 8.3 10e9/L    Absolute Lymphocytes 0.7 (L) 0.8 - 5.3 10e9/L    Absolute Monocytes 0.2 0.0 - 1.3 10e9/L    Absolute Eosinophils 0.0 0.0 - 0.7 10e9/L    Absolute Basophils 0.0 0.0 - 0.2 10e9/L    Abs Immature Granulocytes 0.1 0 - 0.4 10e9/L   Comprehensive metabolic panel   Result Value Ref Range    Sodium 141 133 - 144 mmol/L    Potassium 4.2 3.4 - 5.3 mmol/L    Chloride 108 94 - 109 mmol/L    Carbon Dioxide 21 20 - 32 mmol/L    Anion Gap 12 3 - 14 mmol/L    Glucose 157 (H) 70 - 99 mg/dL    Urea Nitrogen 15 7 - 30 mg/dL    Creatinine 0.89 0.52 - 1.04 mg/dL    GFR Estimate 61 >60 mL/min/1.7m2    GFR Estimate If Black 74 >60 mL/min/1.7m2    Calcium 9.2 8.5 - 10.1 mg/dL    Bilirubin Total 0.9 0.2 - 1.3 mg/dL    Albumin 3.6 3.4 - 5.0 g/dL    Protein Total 7.9 6.8 - 8.8 g/dL    Alkaline Phosphatase 80 40 - 150 U/L    ALT 31 0 - 50 U/L    AST 22 0 - 45 U/L   INR   Result Value Ref Range    INR 0.98 0.86 - 1.14   Troponin I   Result Value Ref Range    Troponin I ES 0.027 0.000 - 0.045 ug/L   Lactic acid whole blood   Result Value Ref Range    Lactic Acid 5.2 (HH) 0.7 - 2.1 mmol/L   Blood gas venous   Result Value Ref Range    Ph Venous 7.32 7.32 - 7.43 pH    PCO2 Venous 46 40 - 50 mm Hg    PO2 Venous 27 25 - 47 mm Hg    Bicarbonate Venous 24 21 - 28 mmol/L    Base Deficit Venous 2.2 mmol/L    FIO2 HIDE    UA with Microscopic   Result Value Ref Range    Color Urine Yellow     Appearance Urine Slightly Cloudy     Glucose Urine Negative NEG mg/dL    Bilirubin Urine Negative NEG    Ketones Urine 5 (A) NEG mg/dL    Specific Gravity Urine 1.010 1.003 - 1.035    Blood Urine Trace (A) NEG    pH Urine 5.5 5.0 - 7.0 pH    Protein Albumin Urine 10 (A) NEG mg/dL    Urobilinogen mg/dL Normal 0.0 -  2.0 mg/dL    Nitrite Urine Positive (A) NEG    Leukocyte Esterase Urine Large (A) NEG    Source Catheterized Urine     WBC Urine 53 (H) 0 - 2 /HPF    RBC Urine 31 (H) 0 - 2 /HPF    WBC Clumps Present (A) NEG /HPF    Squamous Epithelial /HPF Urine 5 (H) 0 - 1 /HPF    Mucous Urine Present (A) NEG /LPF   XR Chest Port 1 View    Narrative    XR CHEST PORT 1 VIEW  6/26/2017 5:42 AM      HISTORY: AMS, fever.     COMPARISON: None.    FINDINGS: Semi-upright portable chest. The heart is at the upper  limits of normal in size without pulmonary edema. Thoracic aorta is  calcified and mildly tortuous. Curvilinear scar at the left lung base.  Small calcified granuloma in the left upper lobe laterally. The lungs  are otherwise clear. The right hemidiaphragm is elevated. No  pneumothorax.      Impression    IMPRESSION: No acute abnormality.    LISA PRINCE MD[TS1.2]         Assessments & Plan (with Medical Decision Making)[TS1.1]   Nguyen Borja is a 83 year old female who presented to the department for evaluation of increased work of breathing and diminished responsiveness from local nursing facility. She arrived febrile and in respiratory failure, seemed to tolerate BiPAP well however. Lung sounds are rhonchorous bilaterally[TS1.5] and chest radiograph reveals left lower lobe infiltrate. Urinalysis also positive for nitrate and leukocyte esterase. Patient's white count with significant leukocytosis and lactate of >5. She received IV fluid resuscitation and ceftriaxone shortly after arrival, followed by azithromycin after appearance of pulmonary infiltrate for coverage of community acquired pneumonia. She became more alert and looking around the room with BiPAP.[TS1.6]    The patient will require admission for[TS1.5] resuscitation of septic infection likely due to pneumonia[TS1.6]. Consulted hospitalist [TS1.5] Juan Antonio regarding patient presentation and workup thus far. He agrees the plan for ICU admission while continuing  noninvasive positive pressure ventilation, also agrees with IV antibiotic selection, no further recommendations[TS1.6]. Temporary transition orders were placed per protocol.[TS1.5]    The patient has[TS1.6] been informed of her results and the recommendation for admission[TS1.5], although uncertain how much she understood[TS1.6].      Disclaimer: This note consists of symbols derived from keyboarding, dictation, and/or voice recognition software. As a result, there may be errors in the script that have gone undetected.  Please consider this when interpreting information found in the chart.[TS1.5]        I have reviewed the nursing notes.    I have reviewed the findings, diagnosis, plan and need for follow up with the patient.[TS1.1]      New Prescriptions    No medications on file       Final diagnoses:   Pneumonia of left lower lobe due to infectious organism (H)   Urinary tract infection, site unspecified   Altered mental status, unspecified altered mental status type   Acute respiratory failure with hypoxia (H)[TS1.2]       6/26/2017   [facility] EMERGENCY DEPARTMENT[TS1.1]     Meet Boston,   06/26/17 0656  [TS1.2]     Revision History        User Key Date/Time User Provider Type Action    > TS1.2 6/26/2017  6:56 AM Meet Boston, DO Physician Sign     TS1.6 6/26/2017  6:53 AM Meet Boston, DO Physician      TS1.5 6/26/2017  6:18 AM Meet Boston, DO Physician      TS1.4 6/26/2017  4:17 AM Meet Boston, DO Physician      TS1.3 6/26/2017  4:15 AM Meet Boston, DO Physician      TS1.1 6/26/2017  4:11 AM Meet Boston, DO Physician             ED Notes by Jalyn Hernandez RN at 6/26/2017  6:48 AM     Author:  Jalyn Hernandez RN Service:  (none) Author Type:  Registered Nurse    Filed:  6/26/2017  6:52 AM Date of Service:  6/26/2017  6:48 AM Creation Time:  6/26/2017  6:52 AM    Status:  Signed :  Jalyn Hernandez RN (Registered Nurse)         Pt moving all limbs and extremities  independently in bed, though does not follow commands when asked. Pt does not respond to name or open eyes when asked. Pt slightly restless at this time. Breathing status stable on bipap. No cough noted. BP stable. Tele still with frequent PVC's, bigeminal at times. IV fluids infusing. Will continue to observe.[LJ1.1]      Revision History        User Key Date/Time User Provider Type Action    > LJ1.1 6/26/2017  6:52 AM Jalyn Hernandez RN Registered Nurse Sign            ED Notes by Agata Lucero RN at 6/26/2017  6:30 AM     Author:  Agata Lucero RN Service:  (none) Author Type:  Registered Nurse    Filed:  6/26/2017  6:32 AM Date of Service:  6/26/2017  6:30 AM Creation Time:  6/26/2017  6:31 AM    Status:  Addendum :  Agata Lucero RN (Registered Nurse)         RT in at 0620 and decreased her FIO2 from 80[JR1.1]% to 50% on BIpap.[JR1.2]     Revision History        User Key Date/Time User Provider Type Action    > [N/A] 6/26/2017  6:32 AM Agata Lucero RN Registered Nurse Addend     JR1.2 6/26/2017  6:31 AM Agata Lucreo RN Registered Nurse Sign     JR1.1 6/26/2017  6:30 AM Agata Lucero RN Registered Nurse             ED Notes by Jalyn Hernandez RN at 6/26/2017  4:45 AM     Author:  Jalyn Hernandez RN Service:  (none) Author Type:  Registered Nurse    Filed:  6/26/2017  6:29 AM Date of Service:  6/26/2017  4:45 AM Creation Time:  6/26/2017  6:28 AM    Status:  Signed :  Jalyn Hernandez RN (Registered Nurse)         Pt rectal temp assessed. Pt had small griffin incontinent stool. Yumiko care completed prior to alonzo insertion.[LJ1.1]      Revision History        User Key Date/Time User Provider Type Action    > LJ1.1 6/26/2017  6:29 AM Jalyn Hernandez RN Registered Nurse Sign            ED Notes by Agata Lucero RN at 6/26/2017  5:57 AM     Author:  Agata Lucero RN Service:  (none) Author Type:  Registered Nurse    Filed:  6/26/2017  6:29 AM Date of Service:   6/26/2017  5:57 AM Creation Time:  6/26/2017  6:07 AM    Status:  Addendum :  Agata Lucero RN (Registered Nurse)         Talked with [facility], [phone], and talked with Zach[JR1.1] informing her of patient's admission to hospital (ICU)[JR1.2].  She had attempted to call[JR1.1] patient's guardian and family with no response when patient was sent to hospital this am 0330.  Zach stated that she was trying again and would leave message to call ICU.  Will report off to dayshift to attempt again this am,  if no response do try to call guardian again.[JR1.2]     Revision History        User Key Date/Time User Provider Type Action    > JR1.2 6/26/2017  6:29 AM Agata Lucero RN Registered Nurse Addend     JR1.1 6/26/2017  6:07 AM Agata Lucero RN Registered Nurse Sign            ED Notes by Grace Bain RN at 6/26/2017  4:50 AM     Author:  Grace Bain RN Service:  Emergency Medicine Author Type:  Registered Nurse    Filed:  6/26/2017  5:24 AM Date of Service:  6/26/2017  4:50 AM Creation Time:  6/26/2017  5:24 AM    Status:  Signed :  Grace Bain RN (Registered Nurse)         DATE:  6/26/2017   TIME OF RECEIPT FROM LAB:  0450  LAB TEST: Lactic Acid  LAB VALUE:  5.2  RESULTS GIVEN WITH READ-BACK TO (PROVIDER):[SH1.1]  Meet Boston DO[SH1.2]  TIME LAB VALUE REPORTED TO PROVIDER:   0452[SH1.1]       Revision History        User Key Date/Time User Provider Type Action    > SH1.2 6/26/2017  5:24 AM Grace Bain RN Registered Nurse Sign     SH1.1 6/26/2017  5:23 AM Grace Bain RN Registered Nurse             ED Notes by Jalyn Hernandez RN at 6/26/2017  5:15 AM     Author:  Jalyn Hernandez RN Service:  (none) Author Type:  Registered Nurse    Filed:  6/26/2017  5:16 AM Date of Service:  6/26/2017  5:15 AM Creation Time:  6/26/2017  5:15 AM    Status:  Signed :  Jalyn Hernandez RN (Registered Nurse)         Dr. Boston notified of frequent bigeminal  pvc'S with hr 100's at this time. No new orders.[LJ1.1]      Revision History        User Key Date/Time User Provider Type Action    > LJ1.1 6/26/2017  5:16 AM Jalyn Hernandez RN Registered Nurse Sign            ED Notes by Grace Bain RN at 6/26/2017  4:04 AM     Author:  Grace Bain RN Service:  (none) Author Type:  Registered Nurse    Filed:  6/26/2017  4:04 AM Date of Service:  6/26/2017  4:04 AM Creation Time:  6/26/2017  4:04 AM    Status:  Signed :  Grace Bain RN (Registered Nurse)         Bed: ED02  Expected date:   Expected time:   Means of arrival:   Comments:     Revision History        User Key Date/Time User Provider Type Action    > SH1.1 6/26/2017  4:04 AM Grace Bain RN Registered Nurse Sign                  Procedure Notes     No notes of this type exist for this encounter.         Progress Notes - Therapies (Notes from 06/25/17 through 06/28/17)      Progress Notes by Celestina Kuo RT at 6/26/2017  7:49 AM     Author:  Celestina Kuo RT Service:  Respiratory Therapy Author Type:  Respiratory Therapist    Filed:  6/26/2017  7:52 AM Date of Service:  6/26/2017  7:49 AM Creation Time:  6/26/2017  7:49 AM    Status:  Signed :  Celestina Kuo RT (Respiratory Therapist)         Assisted staff in transferring pt from ED to ICU7 on BiPAP. Current settings are 10/5, 50%. R10 with pt's SpO2 at 98%. Breath sounds were auscultated bilaterally, with some diminishing/decreased aeration throughout and sparse crackles in the LLL. Continue to check and monitor.    Thank you,  Celestina Kuo RRT-NPS[NC1.1]     Revision History        User Key Date/Time User Provider Type Action    > NC1.1 6/26/2017  7:52 AM Celestina Kuo RT Respiratory Therapist Sign

## 2017-06-26 NOTE — PROGRESS NOTES
Margarita GONZALEZ updating pt's legal guardian, Marlene Betancourt via telephone. Gave POLST to PA.

## 2017-06-26 NOTE — PLAN OF CARE
Problem: Goal Outcome Summary  Goal: Goal Outcome Summary  OT: Patient not medically appropriate for therapies today per charge RN. Will continue to follow and initiate therapy when medically stable/apropriate.

## 2017-06-26 NOTE — PROGRESS NOTES
Web paged Margarita HAM updating her to pt on O2 per NC and tolerating well and  and 187 this shift and asked if would like insulin coverage. HGB A1C ordered.

## 2017-06-27 ENCOUNTER — APPOINTMENT (OUTPATIENT)
Dept: SPEECH THERAPY | Facility: CLINIC | Age: 82
DRG: 871 | End: 2017-06-27
Payer: COMMERCIAL

## 2017-06-27 LAB
ALBUMIN SERPL-MCNC: 2.5 G/DL (ref 3.4–5)
ALP SERPL-CCNC: 47 U/L (ref 40–150)
ALT SERPL W P-5'-P-CCNC: 23 U/L (ref 0–50)
ANION GAP SERPL CALCULATED.3IONS-SCNC: 2 MMOL/L (ref 3–14)
AST SERPL W P-5'-P-CCNC: 22 U/L (ref 0–45)
BILIRUB SERPL-MCNC: 0.9 MG/DL (ref 0.2–1.3)
BUN SERPL-MCNC: 12 MG/DL (ref 7–30)
CALCIUM SERPL-MCNC: 8.5 MG/DL (ref 8.5–10.1)
CHLORIDE SERPL-SCNC: 112 MMOL/L (ref 94–109)
CO2 SERPL-SCNC: 28 MMOL/L (ref 20–32)
CREAT SERPL-MCNC: 1.1 MG/DL (ref 0.52–1.04)
ERYTHROCYTE [DISTWIDTH] IN BLOOD BY AUTOMATED COUNT: 17.6 % (ref 10–15)
GFR SERPL CREATININE-BSD FRML MDRD: 47 ML/MIN/1.7M2
GLUCOSE BLDC GLUCOMTR-MCNC: 133 MG/DL (ref 70–99)
GLUCOSE BLDC GLUCOMTR-MCNC: 155 MG/DL (ref 70–99)
GLUCOSE BLDC GLUCOMTR-MCNC: 155 MG/DL (ref 70–99)
GLUCOSE SERPL-MCNC: 152 MG/DL (ref 70–99)
HCT VFR BLD AUTO: 39.8 % (ref 35–47)
HGB BLD-MCNC: 12.1 G/DL (ref 11.7–15.7)
LACTATE BLD-SCNC: 1.6 MMOL/L (ref 0.7–2.1)
MCH RBC QN AUTO: 26.5 PG (ref 26.5–33)
MCHC RBC AUTO-ENTMCNC: 30.4 G/DL (ref 31.5–36.5)
MCV RBC AUTO: 87 FL (ref 78–100)
PLATELET # BLD AUTO: 246 10E9/L (ref 150–450)
POTASSIUM SERPL-SCNC: 3.7 MMOL/L (ref 3.4–5.3)
PROT SERPL-MCNC: 6.5 G/DL (ref 6.8–8.8)
RBC # BLD AUTO: 4.56 10E12/L (ref 3.8–5.2)
SODIUM SERPL-SCNC: 142 MMOL/L (ref 133–144)
TSH SERPL DL<=0.05 MIU/L-ACNC: 1.05 MU/L (ref 0.4–4)
WBC # BLD AUTO: 17.7 10E9/L (ref 4–11)

## 2017-06-27 PROCEDURE — 40000270 ZZH STATISTIC OXYGEN  O2DAILY TECH TIME

## 2017-06-27 PROCEDURE — 40000275 ZZH STATISTIC RCP TIME EA 10 MIN

## 2017-06-27 PROCEDURE — 36415 COLL VENOUS BLD VENIPUNCTURE: CPT | Performed by: INTERNAL MEDICINE

## 2017-06-27 PROCEDURE — 25000132 ZZH RX MED GY IP 250 OP 250 PS 637: Performed by: INTERNAL MEDICINE

## 2017-06-27 PROCEDURE — 25000125 ZZHC RX 250: Performed by: PHYSICIAN ASSISTANT

## 2017-06-27 PROCEDURE — 25000128 H RX IP 250 OP 636: Performed by: PHYSICIAN ASSISTANT

## 2017-06-27 PROCEDURE — 99233 SBSQ HOSP IP/OBS HIGH 50: CPT | Performed by: INTERNAL MEDICINE

## 2017-06-27 PROCEDURE — 00000146 ZZHCL STATISTIC GLUCOSE BY METER IP

## 2017-06-27 PROCEDURE — 25000132 ZZH RX MED GY IP 250 OP 250 PS 637: Performed by: STUDENT IN AN ORGANIZED HEALTH CARE EDUCATION/TRAINING PROGRAM

## 2017-06-27 PROCEDURE — 40000274 ZZH STATISTIC RCP CONSULT EA 30 MIN

## 2017-06-27 PROCEDURE — 85027 COMPLETE CBC AUTOMATED: CPT | Performed by: INTERNAL MEDICINE

## 2017-06-27 PROCEDURE — 94640 AIRWAY INHALATION TREATMENT: CPT | Mod: 76

## 2017-06-27 PROCEDURE — 92610 EVALUATE SWALLOWING FUNCTION: CPT | Mod: GN

## 2017-06-27 PROCEDURE — 40000225 ZZH STATISTIC SLP WARD VISIT

## 2017-06-27 PROCEDURE — 83605 ASSAY OF LACTIC ACID: CPT | Performed by: PHYSICIAN ASSISTANT

## 2017-06-27 PROCEDURE — 12000011 ZZH R&B MS OVERFLOW

## 2017-06-27 PROCEDURE — 80053 COMPREHEN METABOLIC PANEL: CPT | Performed by: INTERNAL MEDICINE

## 2017-06-27 PROCEDURE — 84443 ASSAY THYROID STIM HORMONE: CPT | Performed by: INTERNAL MEDICINE

## 2017-06-27 PROCEDURE — 25000132 ZZH RX MED GY IP 250 OP 250 PS 637: Performed by: PHYSICIAN ASSISTANT

## 2017-06-27 PROCEDURE — 94640 AIRWAY INHALATION TREATMENT: CPT

## 2017-06-27 RX ADMIN — IPRATROPIUM BROMIDE AND ALBUTEROL SULFATE 3 ML: .5; 3 SOLUTION RESPIRATORY (INHALATION) at 19:49

## 2017-06-27 RX ADMIN — TAZOBACTAM SODIUM AND PIPERACILLIN SODIUM 3.38 G: 375; 3 INJECTION, SOLUTION INTRAVENOUS at 05:11

## 2017-06-27 RX ADMIN — TAZOBACTAM SODIUM AND PIPERACILLIN SODIUM 3.38 G: 375; 3 INJECTION, SOLUTION INTRAVENOUS at 10:26

## 2017-06-27 RX ADMIN — LEVOTHYROXINE SODIUM 75 MCG: 75 TABLET ORAL at 08:27

## 2017-06-27 RX ADMIN — MICONAZOLE NITRATE: 2 POWDER TOPICAL at 18:50

## 2017-06-27 RX ADMIN — IPRATROPIUM BROMIDE AND ALBUTEROL SULFATE 3 ML: .5; 3 SOLUTION RESPIRATORY (INHALATION) at 15:50

## 2017-06-27 RX ADMIN — IPRATROPIUM BROMIDE AND ALBUTEROL SULFATE 3 ML: .5; 3 SOLUTION RESPIRATORY (INHALATION) at 08:54

## 2017-06-27 RX ADMIN — ACETAMINOPHEN 650 MG: 650 SUPPOSITORY RECTAL at 21:41

## 2017-06-27 RX ADMIN — TAZOBACTAM SODIUM AND PIPERACILLIN SODIUM 3.38 G: 375; 3 INJECTION, SOLUTION INTRAVENOUS at 16:49

## 2017-06-27 RX ADMIN — ENOXAPARIN SODIUM 40 MG: 40 INJECTION SUBCUTANEOUS at 08:27

## 2017-06-27 RX ADMIN — IPRATROPIUM BROMIDE AND ALBUTEROL SULFATE 3 ML: .5; 3 SOLUTION RESPIRATORY (INHALATION) at 13:14

## 2017-06-27 RX ADMIN — IPRATROPIUM BROMIDE AND ALBUTEROL SULFATE 3 ML: .5; 3 SOLUTION RESPIRATORY (INHALATION) at 23:20

## 2017-06-27 RX ADMIN — AZITHROMYCIN 250 MG: 250 TABLET, FILM COATED ORAL at 08:27

## 2017-06-27 RX ADMIN — TAZOBACTAM SODIUM AND PIPERACILLIN SODIUM 3.38 G: 375; 3 INJECTION, SOLUTION INTRAVENOUS at 21:29

## 2017-06-27 NOTE — PROGRESS NOTES
RT feeling pt should be on BIPAP, so it was applied at 60% 12/5. Pt sats variable, finger probe moved to toe and readings are more steady and favorable. Will continue to monitor.

## 2017-06-27 NOTE — CONSULTS
Care Transition Initial Assessment - RN        Met with: Patient and Marlene Andersonuarenita.    DATA   Principal Problem:    Sepsis (H)  Active Problems:    Alzheimer's disease    UTI (urinary tract infection)    CAP (community acquired pneumonia)    Elevated lactic acid level    Hypothyroidism       Cognitive Status:Patient is awake and disorientated. Marlene alert and orientated        Contact information and PCP information verified: Yes    Lives With: facility resident                      Insurance concerns: No Insurance issues identified    ASSESSMENT  Patient currently receives the following services:  Lives in [facility]        Identified issues/concerns regarding health management: Marlene sullivan questioning if after discharge patient would be appropriate for Hospice. Marlene is on board with IV and ABX this admission no aggressive measures      PLAN  Financial costs for the patient include none .  Patient given options and choices for discharge Talked with Marlene about return to [facility] will give hospice choices if needed .  Patient/family is agreeable to the plan?  Yes:   Patient anticipates discharging to Return to [facility] .        Patient anticipates needs for home equipment: No  Discharge Planner   Discharge Plans in progress: LTC  Barriers to discharge plan: none  Follow up plan: LTC       Entered by: Carly Betancourt 06/27/2017 9:10 AM           Plan/Disposition: LTC   Appointments: Will be made at discharge    RIAN Betancourt CTS RN    Care  (CTS) will continue to follow as needed.

## 2017-06-27 NOTE — PROGRESS NOTES
VSS. Pt lifted with ceiling lift back to bed and repositioned on right side.  Pt continues nonverbal and confused as is her norm with advanced alzheimer's. Accucheck 144. IV and alonzo intact and patent. Will continue to monitor.

## 2017-06-27 NOTE — PLAN OF CARE
Writer set up ride with [facility] transport tomorrow at 1100 if able to discharge. RIAN Betancourt CTS RN

## 2017-06-27 NOTE — PROGRESS NOTES
Ana Luisa from [facility] called re:blood culture results showing E-coli. Web paged Dr. Cong Khoury.

## 2017-06-27 NOTE — PLAN OF CARE
Problem: Goal Outcome Summary  Goal: Goal Outcome Summary  Outcome: Improving  Pt opens eyes spontaneously and and is more alert/awake. Fed 100% of breakfast and coughed on nectar thick liquids and switched to honey thick and pt swallowed more readily. Updated MD and requested S.T. Eval. Pt. Seen by ST and updated to coughing with swallowing thin liquids at times with no gurgling, choking or sputtering. Diet changed to pureed with thin liquids per S.T.  Pt more vocal with nonsensical sounds at times. No facial grimacing or moaning. Pt cont's to fidget at times with hands and grabbing linens and tubings. Repositioned pt and pt. Calm. Tolerating sitting up in reclining chair well. To change to M/S status per conversation with Dr. QUINTIN Khoury.

## 2017-06-27 NOTE — PROGRESS NOTES
Pt incontinent of very large soft, loose, smelly BM.Cares given and linens changed.BIPAP removed and pt placed on 2 Liters O2 per NC.

## 2017-06-27 NOTE — PROGRESS NOTES
[facility] Medicine Progress Note  Date of Service: 06/27/2017        Assessment & Plan   Nguyen Borja is a 83 year old female who presented on 6/26/2017 with  decreasing responsiveness with history of Dementia and Hypothyroidism.     Sepsis (H)  Possibly due to CAP (possible LLL on portable CXR, inspiratory crackles to RLL)and E. Coli UTI.   - continue IVF at 100cc/hr  - admission to ICU  later will transfer to the floor 6/27/2017  - blood and urine cultures ordered, pending        Community Acquired Pneumonia  CXR on admission revealed possible LLL infiltrate.   - continue BiPAP  - sputum and blood cultures ordered, E. Coli growing in the Blood  - empiric antibiotic coverage with Zosyn (coverage for aspiration) plus azithromycin  - duoneb's ordered Q4, while awake  - titrate supplemental oxygen to keep sats>92%     Urinary Tract Infection  - urine culture ordered, pending  - continue 3.375g Zosyn Q6 hours  - AM CBC with platelets and differential ordered tomorrow AM        Elevated lactic acid level  5.2 on admission.  Given 3L LR in ED.  Presumed secondary to sepsis    - repeat lactic now 1.6  - continue IVF for today     Hx of Significant Dental Procedures  Has had all teeth removed as of 2013. Unclear why these were removed.     Alzheimer's disease  Decreasing responsiveness, prompting ER evaluation.  Baseline non-verbal.  Has count appointed guardian.     Hypothyroidism  - continue PTA synthroid  DVT Prophylaxis: Enoxaparin (Lovenox) SQ  Code Status: DNR/DNI         Plan:   Await Sensitivities to the Blood cultures growing E . Coli  Swallowing evaluation and treatment  Transfer to the medical floor      Disposition: Anticipate discharge   2-3 days               Interval History   Unable to communicate with the patient   Off BiPAP      Physical Exam   Temp:  [99  F (37.2  C)-100  F (37.8  C)] 100  F (37.8  C)  Pulse:  [78] 78  Heart Rate:  [73-99] 79  Resp:  [15-25]  21  BP: ()/(33-88) 121/51  FiO2 (%):  [60 %] 60 %  SpO2:  [87 %-100 %] 96 %    Weights:   Vitals:    06/26/17 0745 06/27/17 0622   Weight: 96.4 kg (212 lb 8.4 oz) 96.5 kg (212 lb 11.9 oz)    Body mass index is 35.4 kg/(m^2).    Constitutional: Not in any acute distress   EYES: Extra Occular movements are intact, Conjunctiva is clear   NECK: no JVD  CVS: S1 S2  Regular  Respiratory: few Right Lower Lung Crepitations without Wheezing  bilaterally  GI: Soft, non tender , Bowel Sounds are Positive   Skin: Warm and dry, No Rashes  CNS: Alert and Plesantly confused  Musculoskeletal: Lower Limbs with Pedal Edema            Data     Recent Labs  Lab 06/27/17  0642 06/26/17  0415   WBC 17.7* 17.3*   HGB 12.1 13.7   MCV 87 85    316   INR  --  0.98    141   POTASSIUM 3.7 4.2   CHLORIDE 112* 108   CO2 28 21   BUN 12 15   CR 1.10* 0.89   ANIONGAP 2* 12   ELOINA 8.5 9.2   * 157*   ALBUMIN 2.5* 3.6   PROTTOTAL 6.5* 7.9   BILITOTAL 0.9 0.9   ALKPHOS 47 80   ALT 23 31   AST 22 22   TROPI  --  0.027         Recent Labs  Lab 06/27/17  0642 06/26/17  2128 06/26/17  1654 06/26/17  1316 06/26/17  0843 06/26/17  0415   *  --   --   --   --  157*   BGM  --  144* 126* 187* 163*  --         Unresulted Labs Ordered in the Past 30 Days of this Admission     Date and Time Order Name Status Description    6/26/2017 0409 Blood culture Preliminary     6/26/2017 0409 Blood culture Preliminary     6/26/2017 0409 Urine Culture Aerobic Bacterial In process              Medications        azithromycin  250 mg Oral Q24H     levothyroxine  75 mcg Oral QAM AC     enoxaparin  40 mg Subcutaneous Q24H     piperacillin-tazobactam  3.375 g Intravenous Q6H     ipratropium - albuterol 0.5 mg/2.5 mg/3 mL  3 mL Nebulization Q4H While awake     insulin aspart  1-7 Units Subcutaneous TID AC     insulin aspart  1-5 Units Subcutaneous At Bedtime             Cong ANDINO.  Hospitalist - Internal Medicine  [facility]

## 2017-06-27 NOTE — PROGRESS NOTES
Pt continues on 2 liters NC O2, sats 91-94%. Pt refuses oral cares or liquids by clamping her mouth shut when offered. AM labs drawn and sent to lab, await results.

## 2017-06-28 VITALS
OXYGEN SATURATION: 94 % | HEART RATE: 76 BPM | BODY MASS INDEX: 35.4 KG/M2 | DIASTOLIC BLOOD PRESSURE: 74 MMHG | SYSTOLIC BLOOD PRESSURE: 92 MMHG | RESPIRATION RATE: 16 BRPM | TEMPERATURE: 98.6 F | WEIGHT: 212.74 LBS

## 2017-06-28 LAB
ANION GAP SERPL CALCULATED.3IONS-SCNC: 8 MMOL/L (ref 3–14)
BACTERIA SPEC CULT: ABNORMAL
BUN SERPL-MCNC: 9 MG/DL (ref 7–30)
CALCIUM SERPL-MCNC: 8.5 MG/DL (ref 8.5–10.1)
CHLORIDE SERPL-SCNC: 112 MMOL/L (ref 94–109)
CO2 SERPL-SCNC: 24 MMOL/L (ref 20–32)
CREAT SERPL-MCNC: 0.92 MG/DL (ref 0.52–1.04)
ERYTHROCYTE [DISTWIDTH] IN BLOOD BY AUTOMATED COUNT: 17.8 % (ref 10–15)
GFR SERPL CREATININE-BSD FRML MDRD: 58 ML/MIN/1.7M2
GLUCOSE SERPL-MCNC: 139 MG/DL (ref 70–99)
HCT VFR BLD AUTO: 37.7 % (ref 35–47)
HGB BLD-MCNC: 11.3 G/DL (ref 11.7–15.7)
MCH RBC QN AUTO: 26.2 PG (ref 26.5–33)
MCHC RBC AUTO-ENTMCNC: 30 G/DL (ref 31.5–36.5)
MCV RBC AUTO: 88 FL (ref 78–100)
MICRO REPORT STATUS: ABNORMAL
MICROORGANISM SPEC CULT: ABNORMAL
PLATELET # BLD AUTO: 236 10E9/L (ref 150–450)
POTASSIUM SERPL-SCNC: 3.7 MMOL/L (ref 3.4–5.3)
RBC # BLD AUTO: 4.31 10E12/L (ref 3.8–5.2)
SODIUM SERPL-SCNC: 144 MMOL/L (ref 133–144)
SPECIMEN SOURCE: ABNORMAL
WBC # BLD AUTO: 10.4 10E9/L (ref 4–11)

## 2017-06-28 PROCEDURE — 94640 AIRWAY INHALATION TREATMENT: CPT

## 2017-06-28 PROCEDURE — 36415 COLL VENOUS BLD VENIPUNCTURE: CPT | Performed by: PHYSICIAN ASSISTANT

## 2017-06-28 PROCEDURE — 85027 COMPLETE CBC AUTOMATED: CPT | Performed by: PHYSICIAN ASSISTANT

## 2017-06-28 PROCEDURE — 25000132 ZZH RX MED GY IP 250 OP 250 PS 637: Performed by: STUDENT IN AN ORGANIZED HEALTH CARE EDUCATION/TRAINING PROGRAM

## 2017-06-28 PROCEDURE — 25000128 H RX IP 250 OP 636: Performed by: PHYSICIAN ASSISTANT

## 2017-06-28 PROCEDURE — 80048 BASIC METABOLIC PNL TOTAL CA: CPT | Performed by: PHYSICIAN ASSISTANT

## 2017-06-28 PROCEDURE — 99239 HOSP IP/OBS DSCHRG MGMT >30: CPT | Performed by: INTERNAL MEDICINE

## 2017-06-28 PROCEDURE — 25000132 ZZH RX MED GY IP 250 OP 250 PS 637: Performed by: PHYSICIAN ASSISTANT

## 2017-06-28 PROCEDURE — 25000125 ZZHC RX 250: Performed by: PHYSICIAN ASSISTANT

## 2017-06-28 RX ORDER — AZITHROMYCIN 250 MG/1
250 TABLET, FILM COATED ORAL EVERY 24 HOURS
Qty: 3 TABLET | Refills: 0 | Status: SHIPPED | OUTPATIENT
Start: 2017-06-28 | End: 2017-07-01

## 2017-06-28 RX ADMIN — TAZOBACTAM SODIUM AND PIPERACILLIN SODIUM 3.38 G: 375; 3 INJECTION, SOLUTION INTRAVENOUS at 09:48

## 2017-06-28 RX ADMIN — TAZOBACTAM SODIUM AND PIPERACILLIN SODIUM 3.38 G: 375; 3 INJECTION, SOLUTION INTRAVENOUS at 03:27

## 2017-06-28 RX ADMIN — IPRATROPIUM BROMIDE AND ALBUTEROL SULFATE 3 ML: .5; 3 SOLUTION RESPIRATORY (INHALATION) at 03:23

## 2017-06-28 RX ADMIN — AZITHROMYCIN 250 MG: 250 TABLET, FILM COATED ORAL at 08:05

## 2017-06-28 RX ADMIN — ENOXAPARIN SODIUM 40 MG: 40 INJECTION SUBCUTANEOUS at 08:05

## 2017-06-28 RX ADMIN — IPRATROPIUM BROMIDE AND ALBUTEROL SULFATE 3 ML: .5; 3 SOLUTION RESPIRATORY (INHALATION) at 08:57

## 2017-06-28 RX ADMIN — LEVOTHYROXINE SODIUM 75 MCG: 75 TABLET ORAL at 08:05

## 2017-06-28 NOTE — PROGRESS NOTES
Impressions: The patient presents with mild oral disorganization of labial seal. Patient was able to complete 4 ounces of thin liquids with no overt s/s of aspiration. Patient used a straw in the evaluation due to impairments with oral preporation. Patient is safe to consume NDD1 with thin liquids.      06/27/17  Clinical Swallow Evaluation   General Information   Onset Date 06/27/17   Start of Care Date 06/27/17   Referring Physician Cong Khoury MD    Patient Profile Review/OT: Additional Occupational Profile Info See Profile for full history and prior level of function   Patient/Family Goals Statement No goals stated.    Swallowing Evaluation Bedside swallow evaluation   Behaviorial Observations Confused   Mode of current nutrition Oral diet   Type of oral diet Dysphagia diet level 1;Honey - thick liquid   Clinical Swallow Evaluation   Oral Musculature unable to assess due to poor participation/comprehension   Additional Documentation Yes   Additional evaluation(s) completed today Yes   Rationale for completing additional evaluation Nursing is concerned with liquids.    Clinical Swallow Eval: Thin Liquid Texture Trial   Mode of Presentation, Thin Liquids straw;fed by clinician   Volume of Liquid or Food Presented 4 ounces   Oral Phase of Swallow other (see comments)  (disorganized lip seal. )   Pharyngeal Phase of Swallow intact   Diagnostic Statement The patient trialed 4 ounces of water with straw.  No coughing, wet voice, or throat clear noted.    Clinical Swallow Eval: Nectar Thick Liquid Texture Trial   Mode of Presentation, Nectar straw;fed by clinician   Volume of Nectar Presented 2 drinks   Oral Phase, Geiger (Disorganized labial seal. )   Pharyngeal Phase, Geiger intact   Diagnostic Statement The patient trialed 2 drinks of nectar thick water with straw.  No coughing, wet voice, or throat clear noted.    Clinical Swallow Eval: Honey Thick Liquid Texture Trial   Mode of Presentation, Honey straw;fed by  clinician   Volume of Honey Presented 2 drinks   Oral Phase, Honey (disorganized lip seal)   Pharyngeal Phase, Honey intact   Diagnostic Statement The patient trialed 2 drinks of honey thick water with straw.  No coughing, wet voice, or throat clear noted.    Clinical Swallow Eval: Pudding Thick Liquid Texture Trial   Mode of Presentation, Pudding spoon;fed by clinician   Volume of Pudding Presented 2 bites   Oral Phase, Pudding WFL   Pharyngeal Phase, Pudding intact   Diagnostic Statement The patient trialed 2 bites of pudding with straw.  No coughing, wet voice, or throat clear noted.    VFSS Evaluation   VFSS Additional Documentation No   FEES Evaluation   Additional Documentation No   Swallow Compensations   Swallow Compensations No compensations were used   Esophageal Phase of Swallow   Patient reports or presents with symptoms of esophageal dysphagia No   Swallow Eval: Clinical Impressions   Skilled Criteria for Therapy Intervention No significant expected  improvement in functional status   Functional Assessment Scale (FAS) 3   Dysphagia Outcome Severity Scale (DELMA) Level 3 - DELMA   Treatment Diagnosis Dysphagia   OT: Clinical Decision Making (Complexity) Moderate complexity   Diet texture recommendations Dysphagia diet level 1;Thin liquids   Recommended Feeding/Eating Techniques alternate between small bites and sips of food/liquid;small sips/bites   Risks and Benefits of Treatment have been explained. Yes   Patient, family and/or staff in agreement with Plan of Care Yes   Clinical Impression Comments The patient presents with mild oral disorganization with labial seal. Patient was able to complete 4 ounces of thin liquids with no overt s/s of aspiration. Patient used a straw in the evaluation due to impairments with oral preporation. Patient is safe to consume NDD1 with thin liquids.    Therapy Certification   Certification date from 06/27/17   Certification date to 06/27/17   Medical Diagnosis Dysphagia    Certification I certify the need for these services furnished under this plan of treatment and while under my care.  (Physician co-signature of this document indicates review and certification of the therapy plan).   Total Evaluation Time   Total Evaluation Time (Minutes) 30

## 2017-06-28 NOTE — PHARMACY - DISCHARGE MEDICATION RECONCILIATION
Discharge medication review for this patient is complete. Pharmacist assisted with medication reconciliation of discharge medications with prior to admission medications.     The following changes were made to the discharge medication list based on pharmacist review:  Added:  none  Discontinued: none  Changed: none      Patient's Discharge Medication List  - medications as listed on After Visit Summary (AVS)     Review of your medicines      START taking       Dose / Directions    amoxicillin-clavulanate 875-125 MG per tablet   Commonly known as:  AUGMENTIN   Used for:  Pneumonia of left lower lobe due to infectious organism (H)        Dose:  1 tablet   Take 1 tablet by mouth 2 times daily for 8 days   Quantity:  16 tablet   Refills:  0       azithromycin 250 MG tablet   Commonly known as:  ZITHROMAX   Indication:  Community Acquired Pneumonia   Used for:  Pneumonia of left lower lobe due to infectious organism (H)        Dose:  250 mg   Take 1 tablet (250 mg) by mouth every 24 hours for 3 days   Quantity:  3 tablet   Refills:  0         CONTINUE these medicines which have NOT CHANGED       Dose / Directions    chlorhexidine 0.12 % solution   Commonly known as:  PERIDEX        Take by mouth 2 times daily One oral strip by mouth two times a day for gingivitis.  Swab mouth/gums BID after oral cares.   Refills:  0       nystatin [id number] UNIT/GM Powd   Commonly known as:  MYCOSTATIN        Apply topically daily as needed   Refills:  0       SYNTHROID 75 MCG tablet   Generic drug:  levothyroxine        Dose:  75 mcg   Take 75 mcg by mouth every morning   Refills:  0         STOP taking          HYDROcodone-acetaminophen 5-325 MG per tablet   Commonly known as:  NORCO                Where to get your medicines      Some of these will need a paper prescription and others can be bought over the counter. Ask your nurse if you have questions.     Bring a paper prescription for each of these medications       amoxicillin-clavulanate 875-125 MG per tablet     azithromycin 250 MG tablet

## 2017-06-28 NOTE — PROGRESS NOTES
Name: Nguyen Borja    MRN#: [id number]    Reason for Hospitalization: Acute respiratory failure with hypoxia (H) [J96.01]  Altered mental status, unspecified altered mental status type [R41.82]  Pneumonia of left lower lobe due to infectious organism (H) [J18.1]  Urinary tract infection, site unspecified [N39.0]    Discharge Date: 6/28/2017    Patient / Family response to discharge plan: Pt will dc today at 1100 back to her long term care placement at [facility] (Phone: [phone] Fax: [phone]) via non emergency transport, stretcher as pt is not able to sit in wheelchair. This writer spoke with legal guardian, Marlene Serafin [phone] in regards to hospice, she would like a hospice consult placed. This writer offered hospice agencies, Marlene is familiar and chose [facility] (phone: [phone] Fax: [phone]). Referral placed, and [facility] hospice team notified and in agreement. This writer contacted SNF and they are in agreement with dc plan.     Other Providers (Care Coordinator, County Services, PCA services etc): No    Future Appointments: No future appointments.    Discharge Disposition: long term care facility    Taya SHRESTHA, [facility], [facility] [phone]

## 2017-06-28 NOTE — PLAN OF CARE
Problem: Respiratory Insufficiency (Adult)  Goal: Identify Related Risk Factors and Signs and Symptoms  Related risk factors and signs and symptoms are identified upon initiation of Human Response Clinical Practice Guideline (CPG)   Temp 100.0 rectally - tylenol given for pain and temp.  Patient is unable to verbalize needs, but noted to grimace with movement while turning the patient.  She continues on 1 liter of oxygen to maintain oxygen saturations over 92%.  Unable to assess mentation as she does not follow commands, or answer questions.  Please see flow sheet for further documentation.

## 2017-06-28 NOTE — PROGRESS NOTES
Palliative Care Brief Note  Date:  June 27, 2017     Referral received from Dr Cong Khoury  Examined patient; nonverbal, unable to verbalize any words at all  Dependent in all ADLs, some tongue-thrusting behaviors    Based on the above, she meets FAST hospice criteria.  Call placed to guardian at 5pm--no answer, left message.  Advised Care Transitions of my assessment.    Collin Stuart CNP ([place])  Palliative Care  Private cell:  [phone]

## 2017-06-28 NOTE — PLAN OF CARE
Problem: Goal Outcome Summary  Goal: Goal Outcome Summary  Outcome: Completed Date Met:  06/28/17  Able to wean off oxygen sats and respiratory rate have remained stable. Afebrile. Wbc 10.4, problem resolved. Plan to discharge back to NH, ride scheduled for 1100, daughter Katelynn updated and informed of discharge. Spence removed at 945

## 2017-06-28 NOTE — PLAN OF CARE
Problem: Goal Outcome Summary  Goal: Goal Outcome Summary  Outcome: Improving  Pt has had multiple loose and liquid stools that are yellow/brownish with some odor. No mucous noted to stool. Pt has reddened skin to inner buttocks/coccyx area that is blanchable and has a 1 CM in diameter surface excoriated area to left inner buttock. Barrier cream applied after cleansing and drying perirectal area gently. Miconazole powder applied to cont'd pink crease to under breasts, rt. > left. Axillas were odorous and moist and pink. Axillas cleansed well and dried and deodorant applied. Lung sounds have increased air movement bilat with cont's fine crackles to rt. Base. SAO2 decreased to 87% on room air and O2 reapplied and decreased to 1 L/NC.

## 2017-06-28 NOTE — PROGRESS NOTES
[facility] DISCHARGE NOTE    Patient discharged to nursing home at 11:05 AM via cart. Accompanied by other:medics and staff. Discharge instructions reviewed with NH staff, opportunity offered to ask questions. Prescriptions filled and sent with patient upon discharge. All belongings sent with patient.  Report called and given to Kathleen ALVAREZ at facility  Becca Mark

## 2017-06-28 NOTE — DISCHARGE SUMMARY
[facility]    Discharge Summary  Hospital Medicine    Date of Admission:  6/26/2017  Date of Discharge:  6/28/2017   Discharging Provider: Cong Khoury  Date of Service: 6/28/2017     Primary Care     Sandie Ceja  [facility] [address] [facility] [address]  [facility] [address]      Identification and Chief Compaint: Nguyen Borja is a 83 year old female with  alzheimer's disease and hypothyroidism who presented with decreasing responsiveness.    Discharge Diagnoses       Sepsis (H) due to Left Lower Lobe Pneumonia    Metabolic Encephalopathy due to Pneumonia    Alzheimer's disease    E. Coli UTI (urinary tract infection)    Left Lower Lobe Pneumonia ? Due to Aspiration    Elevated lactic acid level - Better    Hypothyroidism      Discharge Disposition   Discharged to nursing home    Discharge Orders     Home Care Referral     General info for SNF   Length of Stay Estimate: Long Term Care  Condition at Discharge: Improving  Level of care:skilled   Rehabilitation Potential: Fair  Admission H&P remains valid and up-to-date: Yes  Recent Chemotherapy: N/A  Use Nursing Home Standing Orders: Yes     Mantoux instructions   Give two-step Mantoux (PPD) Per Facility Policy Yes     Activity - Up with nursing assistance     Physical Therapy Adult Consult   Evaluate and treat as clinically indicated.    Reason:  Weakness     Occupational Therapy Adult Consult   Evaluate and treat as clinically indicated.    Reason:  Weakness     Fall precautions     Advance Diet as Tolerated   Follow this diet upon discharge: Orders Placed This Encounter     Dysphagia Diet Level 1 Pureed Thin Liquids (water, ice chips, juice, milk gelatin, ice cream, etc)       Discharge Medications   Current Discharge Medication List      START taking these medications    Details   azithromycin (ZITHROMAX) 250 MG tablet Take 1 tablet (250 mg) by mouth every 24 hours for 3 days  Qty: 3 tablet, Refills: 0    Associated Diagnoses:  Pneumonia of left lower lobe due to infectious organism (H)      amoxicillin-clavulanate (AUGMENTIN) 875-125 MG per tablet Take 1 tablet by mouth 2 times daily for 8 days  Qty: 16 tablet, Refills: 0    Associated Diagnoses: Pneumonia of left lower lobe due to infectious organism (H)         CONTINUE these medications which have NOT CHANGED    Details   chlorhexidine (PERIDEX) 0.12 % solution Take by mouth 2 times daily One oral strip by mouth two times a day for gingivitis.  Swab mouth/gums BID after oral cares.      nystatin (MYCOSTATIN) 835027 UNIT/GM POWD Apply topically daily as needed      levothyroxine (SYNTHROID) 75 MCG tablet Take 75 mcg by mouth every morning         STOP taking these medications       HYDROcodone-acetaminophen (NORCO) 5-325 MG per tablet Comments:   Reason for Stopping:             Allergies   No Known Allergies    Consultations This Hospital Stay  PHYSICAL THERAPY ADULT IP CONSULT  OCCUPATIONAL THERAPY ADULT IP CONSULT  CARE TRANSITION RN/SW IP CONSULT  PALLIATIVE CARE ADULT IP CONSULT  SPEECH LANGUAGE PATH ADULT IP CONSULT  PHYSICAL THERAPY ADULT IP CONSULT  OCCUPATIONAL THERAPY ADULT IP CONSULT    Significant Results and Procedures   Procedures    Had BiPAP treatment     Data   Results for orders placed or performed during the hospital encounter of 06/26/17   XR Chest Port 1 View    Narrative    XR CHEST PORT 1 VIEW  6/26/2017 5:42 AM      HISTORY: AMS, fever.     COMPARISON: None.    FINDINGS: Semi-upright portable chest. The heart is at the upper  limits of normal in size without pulmonary edema. Thoracic aorta is  calcified and mildly tortuous. Curvilinear scar at the left lung base.  Small calcified granuloma in the left upper lobe laterally. The lungs  are otherwise clear. The right hemidiaphragm is elevated. No  pneumothorax.      Impression    IMPRESSION: No acute abnormality.    LISA PRINCE MD       History of Present Illness   (From H&P)  Nguyen Borja is a 83 year old  female with  alzheimer's disease and hypothyroidism who presented with decreasing responsiveness.      Hospital Course   Nguyen Borja was admitted on 6/26/2017.  The following problems were addressed during her hospitalization:    Sepsis (H)  Possibly due to CAP (possible LLL on portable CXR, inspiratory crackles to RLL) and E. Coli UTI.   - was treated with  IVF   - admission to ICU     - blood and urine cultures ordered,grew E. Coli           Left Lower Lobe Pneumonia ? Aspiration  CXR on admission revealed possible LLL infiltrate.   - treated with  BiPAP for Acute Respiratory Failure - which later improved  - sputum and blood cultures ordered, E. Coli growing in the Blood  - empiric antibiotic coverage with Zosyn (coverage for aspiration) plus azithromycin which was later changed to Augmentin and Azithromycin  - duoneb's ordered Q4, while awake  - titrate supplemental oxygen off before discharge      E. Coli Urinary Tract Infection  - continue 3.375g Zosyn Q6 hours          Elevated lactic acid level  5.2 on admission.  Given 3L LR in ED.  Presumed secondary to sepsis  - repeat lactic  1.6        Hx of Significant Dental Procedures  Has had all teeth removed as of 2013. Unclear why these were removed.      Alzheimer's disease  Decreasing responsiveness, prompting ER evaluation.  Baseline non-verbal.  Has count appointed guardian.          Pending Results   Unresulted Labs Ordered in the Past 30 Days of this Admission     Date and Time Order Name Status Description    6/26/2017 0409 Blood culture Preliminary     6/26/2017 0409 Blood culture Preliminary     6/26/2017 0409 Urine Culture Aerobic Bacterial Preliminary           Physical Exam   Temp:  [98.6  F (37  C)-100.1  F (37.8  C)] 98.6  F (37  C)  Pulse:  [76] 76  Heart Rate:  [67-90] 71  Resp:  [13-26] 16  BP: ()/(60-81) 92/74  FiO2 (%):  [95 %] 95 %  SpO2:  [87 %-98 %] 94 %  Vitals:    06/26/17 0745 06/27/17 0622   Weight: 96.4 kg (212 lb 8.4 oz) 96.5  kg (212 lb 11.9 oz)       Constitutional: Not in acute distress  CV: Regular  Respiratory: CTA bilaterally  GI: Soft, nontender  Skin: Warm and dry  Alert but Pleasantly confused     The discharge plan was unable to be discussed with the patient's Caregiver but left a message to call back if questions    Total time on this discharge was 35 minutes    Cong Khoury

## 2017-06-29 LAB
BACTERIA SPEC CULT: ABNORMAL
Lab: ABNORMAL
MICRO REPORT STATUS: ABNORMAL
MICROORGANISM SPEC CULT: ABNORMAL
SPECIMEN SOURCE: ABNORMAL

## 2017-06-30 ENCOUNTER — NURSING HOME VISIT (OUTPATIENT)
Dept: GERIATRICS | Facility: CLINIC | Age: 82
End: 2017-06-30
Payer: COMMERCIAL

## 2017-06-30 VITALS
RESPIRATION RATE: 22 BRPM | WEIGHT: 206.6 LBS | SYSTOLIC BLOOD PRESSURE: 177 MMHG | HEART RATE: 67 BPM | BODY MASS INDEX: 34.38 KG/M2 | DIASTOLIC BLOOD PRESSURE: 92 MMHG | TEMPERATURE: 98.8 F | OXYGEN SATURATION: 95 %

## 2017-06-30 DIAGNOSIS — F02.80 LATE ONSET ALZHEIMER'S DISEASE WITHOUT BEHAVIORAL DISTURBANCE (H): ICD-10-CM

## 2017-06-30 DIAGNOSIS — Z51.5 HOSPICE CARE PATIENT: ICD-10-CM

## 2017-06-30 DIAGNOSIS — J18.9 CAP (COMMUNITY ACQUIRED PNEUMONIA): Primary | ICD-10-CM

## 2017-06-30 DIAGNOSIS — G30.1 LATE ONSET ALZHEIMER'S DISEASE WITHOUT BEHAVIORAL DISTURBANCE (H): ICD-10-CM

## 2017-06-30 LAB
BACTERIA SPEC CULT: ABNORMAL
Lab: ABNORMAL
MICRO REPORT STATUS: ABNORMAL
SPECIMEN SOURCE: ABNORMAL

## 2017-06-30 PROCEDURE — 99309 SBSQ NF CARE MODERATE MDM 30: CPT | Performed by: NURSE PRACTITIONER

## 2017-06-30 PROCEDURE — 99207 ZZC CDG-CORRECTLY CODED, REVIEWED AND AGREE: CPT | Performed by: NURSE PRACTITIONER

## 2017-06-30 RX ORDER — HYDROCODONE BITARTRATE AND ACETAMINOPHEN 5; 325 MG/1; MG/1
2 TABLET ORAL 3 TIMES DAILY
COMMUNITY
End: 2020-01-01

## 2017-06-30 NOTE — PROGRESS NOTES
[facility]    Chief Complaint   Patient presents with     Hospital F/U       HPI:    Nguyen Borja is a 83 year old  (1/11/1934), who is being seen today for an episodic care visit at [facility].  HPI information obtained from: facility chart records, facility staff and POA, Hospice SW and RN.    Today's concern is:  CAP (community acquired pneumonia)  Late onset Alzheimer's disease without behavioral disturbance  Hospice care patient  Patient returned to facility from hospital stay for sepsis/CAP.  POA has opted for hospice care      ALLERGIES: Review of patient's allergies indicates no known allergies.  Past Medical, Surgical, Family and Social History reviewed and updated in EPIC.    Current Outpatient Prescriptions   Medication Sig Dispense Refill     HYDROcodone-acetaminophen (NORCO) 5-325 MG per tablet Take 1 tablet by mouth 3 times daily       azithromycin (ZITHROMAX) 250 MG tablet Take 1 tablet (250 mg) by mouth every 24 hours for 3 days 3 tablet 0     amoxicillin-clavulanate (AUGMENTIN) 875-125 MG per tablet Take 1 tablet by mouth 2 times daily for 8 days 16 tablet 0     chlorhexidine (PERIDEX) 0.12 % solution Take by mouth 2 times daily One oral strip by mouth two times a day for gingivitis.  Swab mouth/gums BID after oral cares.       nystatin (MYCOSTATIN) 522800 UNIT/GM POWD Apply topically daily as needed       levothyroxine (SYNTHROID) 75 MCG tablet Take 75 mcg by mouth every morning       Medications reviewed:  Medications reconciled to facility chart and changes were made to reflect current medications as identified as above med list.     REVIEW OF SYSTEMS:  Unobtainable secondary to cognitive impairment or aphasia.    Physical Exam:  BP (!) 177/92  Pulse 67  Temp 98.8  F (37.1  C)  Resp 22  Wt 206 lb 9.6 oz (93.7 kg)  SpO2 95%  BMI 34.38 kg/m2  GENERAL APPEARANCE:  morbidly obese, sleeping, responds to verbal and tactile stimulation  RESP:  expiratory wheezes  bilateral upper lobes, diminshed sounds lower lobes.  on O2 3lpm per nc. not using accessory muscles  CV:  Palpation and auscultation of heart done , regular rate and rhythm, no murmur, rub, or gallop, HRR irregularly irregular, bradycardic--HR between 50-64  M/S:   bedridden, no purposeful movement, contractions of extremeties  SKIN:  fungal infection  both auxillary regions  PSYCH:  responds to name, no purposeful verbalization, moaning    CBC RESULTS:   Recent Labs   Lab Test  06/28/17   0625  06/27/17   0642   WBC  10.4  17.7*   RBC  4.31  4.56   HGB  11.3*  12.1   HCT  37.7  39.8   MCV  88  87   MCH  26.2*  26.5   MCHC  30.0*  30.4*   RDW  17.8*  17.6*   PLT  236  246       Last Basic Metabolic Panel:  Recent Labs   Lab Test  06/28/17   0625  06/27/17   0642   NA  144  142   POTASSIUM  3.7  3.7   CHLORIDE  112*  112*   ELOINA  8.5  8.5   CO2  24  28   BUN  9  12   CR  0.92  1.10*   GLC  139*  152*       Liver Function Studies -   Recent Labs   Lab Test  06/27/17   0642  06/26/17   0415   PROTTOTAL  6.5*  7.9   ALBUMIN  2.5*  3.6   BILITOTAL  0.9  0.9   ALKPHOS  47  80   AST  22  22   ALT  23  31       TSH   Date Value Ref Range Status   06/27/2017 1.05 0.40 - 4.00 mU/L Final   03/16/2017 4.24 (H) 0.40 - 4.00 mU/L Final   ]    Lab Results   Component Value Date    A1C 6.6 06/26/2017    A1C 5.8 10/05/2011         Assessment/Plan:  CAP (community acquired pneumonia)  Late onset Alzheimer's disease without behavioral disturbance  Hospice care patient  Hospice services to start today.  Meds for comfort.  Will order [place] until the hospice meds are available.       Orders:  1. Norco 5-325mg PO TID until hospice comfort medications are available       Electronically signed by  TONE Worthy CNP

## 2017-07-03 ENCOUNTER — TELEPHONE (OUTPATIENT)
Dept: GERIATRICS | Facility: CLINIC | Age: 82
End: 2017-07-03

## 2017-07-03 NOTE — TELEPHONE ENCOUNTER
Hospice Update-No Response Required  ADMISSION SUMMARY NOTE BY: Megan Fernandes, RN  PRIMARY PROVIDER: Dr. Shields  PRIMARY DX: CAP (Community Acquired Pneumonia)  CO-MORBIDITIES: Alzheimer's Disease, hx UTI, sepsis,   POLST: DNR/DNI  CURRENT STATUS/LCD'S FOR CERT: KPS 30, FAST 7d; Pt non-verbal/disoriented. Occasional negative vocalizations/noises r/t 2/10 PAINAD scale rating- re-instated Norco order. LSs having wheezing and Pt grunting on exhale. Recent ED visit for Pneumonia- on 3L continuous O2. UTI with sepsis. Pt is incontinent of B&B, trends BMs daily and 'loose/diarrhea'. Intake good, requires hand feeding- BMI over 35, newly ordered pureed diet. Requires assist with all cares, Pt unable to even sit up/using pat lift for transfers. Pt has scratch-like open abrasions (no exudate) on armpits and nystatin being applied to armpits/under breasts PRN for yeast rash. BUEs contracted/very stiff, unable to get BP.   MED CHANGES: Added back scheduled [place] (as hospital D/C'd) & added comfort kit meds of- Ativan, Atropine, Roxanol and Senna-S.  PATIENT GOALS: Did not verbalize d/t condition.  FAMILY GOALS: Legal guardian stated keeping her comfortable is main goal.  SMART GOAL: To have less sx of abnormal respiratory status.  PLAN FOR NEXT 2 WEEKS: SNV 1-3x/wk with 5 PRN for med/sx mgt, EOL education and emotional support. HHA 2x/wk for assist with completion of ADLs, presence, additional support. MSW 1-3x/mo for any resource connection needs and emotional support. MT for eval.  requested. Volunteers declined.

## 2017-07-10 VITALS
DIASTOLIC BLOOD PRESSURE: 56 MMHG | WEIGHT: 204 LBS | HEART RATE: 82 BPM | BODY MASS INDEX: 33.99 KG/M2 | HEIGHT: 65 IN | TEMPERATURE: 98.3 F | SYSTOLIC BLOOD PRESSURE: 129 MMHG

## 2017-07-10 RX ORDER — MORPHINE SULFATE 100 MG/5ML
5 SOLUTION ORAL
COMMUNITY
End: 2018-08-06

## 2017-07-10 RX ORDER — AMOXICILLIN 250 MG
1 CAPSULE ORAL 2 TIMES DAILY PRN
COMMUNITY
End: 2019-11-21
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
## 2017-07-11 ENCOUNTER — TELEPHONE (OUTPATIENT)
Dept: GERIATRICS | Facility: CLINIC | Age: 82
End: 2017-07-11

## 2017-07-11 ENCOUNTER — NURSING HOME VISIT (OUTPATIENT)
Dept: GERIATRICS | Facility: CLINIC | Age: 82
End: 2017-07-11
Payer: COMMERCIAL

## 2017-07-11 DIAGNOSIS — R54 FRAILTY: ICD-10-CM

## 2017-07-11 DIAGNOSIS — Z51.5 HOSPICE CARE PATIENT: ICD-10-CM

## 2017-07-11 DIAGNOSIS — G30.1 LATE ONSET ALZHEIMER'S DISEASE WITHOUT BEHAVIORAL DISTURBANCE (H): Primary | ICD-10-CM

## 2017-07-11 DIAGNOSIS — F02.80 LATE ONSET ALZHEIMER'S DISEASE WITHOUT BEHAVIORAL DISTURBANCE (H): Primary | ICD-10-CM

## 2017-07-11 PROCEDURE — 99318 ZZC ANNUAL NURSING FAC ASSESSMNT, STABLE: CPT | Mod: 25 | Performed by: NURSE PRACTITIONER

## 2017-07-11 PROCEDURE — 99207 ZZC CDG-CORRECTLY CODED, REVIEWED AND AGREE: CPT | Performed by: NURSE PRACTITIONER

## 2017-07-11 NOTE — TELEPHONE ENCOUNTER
Hospice Update-No Response Required  IDT NOTE BY: Jeni Bryson, RN  PRIMARY PROVIDER: Dr. Shields  PRIMARY DX: Alzheimer's Disease  CO-MORBIDITIES: CAP, hx UTI, sepsis,   POLST: DNR/DNI  CURRENT STATUS:  Pt did not appear to have a good fit in WC as pt was slouching and sliding out of chair, broda chair ordered which pt appears much more comfortable in. Pt consumes % of 2-3 meals a day, staff noticing a significant decrease in fluid intake in the last week. Pt having some irritation under arms and breasts, powder applied. Pt having some signs of pain with movement and cares. Taking one tab of norco TID and has roxanol 5mg PRN for breakthrough pain. KPS 30, FAST 7d  MED CHANGES: No recent med changes  PATIENT GOALS: Did not verbalize d/t condition.  FAMILY GOALS: Legal guardian stated keeping her comfortable is main goal.  SMART GOAL: To have less sx of abnormal respiratory status.  PLAN FOR NEXT 2 WEEKS: SNV 1x/wk for med/sx mgt, EOL education and emotional support. HHA 2x/wk for assist with completion of ADLs, presence, additional support. MSW 1-3x/mo for any resource connection needs and emotional support. MT for eval.  requested. Volunteers declined.

## 2017-07-25 ENCOUNTER — TELEPHONE (OUTPATIENT)
Dept: GERIATRICS | Facility: CLINIC | Age: 82
End: 2017-07-25

## 2017-07-25 NOTE — TELEPHONE ENCOUNTER
Hospice Update-No Response Required  IDT NOTE BY: Jeni Bryson, RN  PRIMARY PROVIDER: Dr. Shields  PRIMARY DX: Alzheimer's Disease  CO-MORBIDITIES: CAP, hx UTI, sepsis,   POLST: DNR/DNI  CURRENT STATUS:  Pt's appetite varies, eating bites to 100% of meals. Decrease in fluid intake taking in only sips to 240 cc of fluid daily. Pt requires more time with hand feeding per staff reports. Pt is unable to answer any even simple questions during last SNV. Periarea redness continues but is showing much improvement with use of miconazole powder. KPS 30, FAST 7d  MED CHANGES: No recent med changes  PATIENT GOALS: Did not verbalize d/t condition.  FAMILY GOALS: Legal guardian stated keeping her comfortable is main goal.  SMART GOAL: To have less sx of abnormal respiratory status.  PLAN FOR NEXT 2 WEEKS: SNV 1x/wk for med/sx mgt, EOL education and emotional support. HHA 2x/wk for assist with completion of ADLs, presence, additional support. MSW 1-3x/mo for any resource connection needs and emotional support. MT for eval.  requested. Volunteers declined.

## 2017-08-08 ENCOUNTER — TELEPHONE (OUTPATIENT)
Dept: GERIATRICS | Facility: CLINIC | Age: 82
End: 2017-08-08

## 2017-08-08 VITALS
OXYGEN SATURATION: 95 % | WEIGHT: 203.2 LBS | TEMPERATURE: 97.3 F | HEART RATE: 79 BPM | SYSTOLIC BLOOD PRESSURE: 108 MMHG | DIASTOLIC BLOOD PRESSURE: 62 MMHG | BODY MASS INDEX: 33.81 KG/M2 | RESPIRATION RATE: 20 BRPM

## 2017-08-08 NOTE — TELEPHONE ENCOUNTER
Hospice Update-No Response Required  IDT NOTE BY: Jeni Bryson, RN  PRIMARY PROVIDER: Dr. Shields/Stacy Ceja NP  PRIMARY DX: Alzheimer's Disease  CO-MORBIDITIES: CAP, hx UTI, sepsis,   POLST: DNR/DNI  CURRENT STATUS: Pt lethargic last SNV, did not make eye contact with writer during visit. Unable to answer any yes/no questions. Pt mumbling intermittently-unable to understand. Pt having some difficulty swallowing as pt has been coughing with drinking fluids per staff reports. Redness in groin and underarms-staff apply miconazole which has been effective at reducing redness. KPS 30, FAST 7d  MED CHANGES: No recent med changes  PATIENT GOALS: Did not verbalize d/t condition.  FAMILY GOALS: Legal guardian stated keeping her comfortable is main goal.  SMART GOAL: To have less sx of abnormal respiratory status.  PLAN FOR NEXT 2 WEEKS: SNV 1x/wk for med/sx mgt, EOL education and emotional support. HHA 2x/wk for assist with completion of ADLs, presence, additional support. MSW 1-3x/mo for any resource connection needs and emotional support. MT for eval.  requested. Volunteers declined.

## 2017-08-09 ENCOUNTER — NURSING HOME VISIT (OUTPATIENT)
Dept: GERIATRICS | Facility: CLINIC | Age: 82
End: 2017-08-09
Payer: COMMERCIAL

## 2017-08-09 DIAGNOSIS — F02.80 LATE ONSET ALZHEIMER'S DISEASE WITHOUT BEHAVIORAL DISTURBANCE (H): ICD-10-CM

## 2017-08-09 DIAGNOSIS — Z51.5 HOSPICE CARE PATIENT: ICD-10-CM

## 2017-08-09 DIAGNOSIS — R62.7 ADULT FAILURE TO THRIVE: Primary | ICD-10-CM

## 2017-08-09 DIAGNOSIS — G30.1 LATE ONSET ALZHEIMER'S DISEASE WITHOUT BEHAVIORAL DISTURBANCE (H): ICD-10-CM

## 2017-08-09 PROCEDURE — 99207 ZZC CDG-CORRECTLY CODED, REVIEWED AND AGREE: CPT | Performed by: FAMILY MEDICINE

## 2017-08-09 PROCEDURE — 99309 SBSQ NF CARE MODERATE MDM 30: CPT | Mod: GW | Performed by: FAMILY MEDICINE

## 2017-08-22 ENCOUNTER — TELEPHONE (OUTPATIENT)
Dept: GERIATRICS | Facility: CLINIC | Age: 82
End: 2017-08-22

## 2017-08-22 NOTE — TELEPHONE ENCOUNTER
Hospice Update-No Response Required  IDT NOTE BY: Jeni Bryson, RN  PRIMARY PROVIDER: Dr. Shields/Stacy Ceja NP  PRIMARY DX: Alzheimer's Disease  CO-MORBIDITIES: CAP, hx UTI, sepsis,   POLST: DNR/DNI  CURRENT STATUS: This is an 83 year old who resides at [facility] in [place]. Pt requires total assistance with all ADLs. Uses pat lift for transfers. Uses broda chair as main mode of transportation, pt leaning more to the right. Pt requires total hand feeding and mechanical soft diet d/t difficulty swallowing. Appetite varies between % of 2-3 meals a day. Staff report that fluid intake has decreased as pt only tolerating sips of fluids. Pain currently managed with Norco TID. No reported/observed SOB recently. Redness in groin folds resolved with use of miconazole powder. KPS 30, FAST 7d  MED CHANGES: No recent med changes  PATIENT GOALS: Did not verbalize d/t condition.  FAMILY GOALS: Legal guardian stated keeping her comfortable is main goal.  SMART GOAL: Redness to periarea will improve  PLAN FOR NEXT 2 WEEKS: SNV 1x/wk for med/sx mgt, EOL education and emotional support. HHA 2x/wk for assist with completion of ADLs, presence, additional support. MSW 1-3x/mo for any resource connection needs and emotional support. MT for comfort.  for spiritual support.

## 2017-09-05 ENCOUNTER — TELEPHONE (OUTPATIENT)
Dept: GERIATRICS | Facility: CLINIC | Age: 82
End: 2017-09-05

## 2017-09-05 NOTE — TELEPHONE ENCOUNTER
Hospice Update-No Response Required  IDT NOTE BY: Jeni Bryson, RN  PRIMARY PROVIDER: Dr. Shields/Stacy Ceja NP  PRIMARY DX: Alzheimer's Disease  CO-MORBIDITIES: CAP, hx UTI, sepsis,   POLST: DNR/DNI  CURRENT STATUS: This is an 83 year old who resides at [facility] in [place]. Pt requires total assistance with all ADLs. Uses pat lift for transfers. Uses broda chair as main mode of transportation. Pt requires total hand feeding and mechanical soft diet d/t difficulty swallowing. Appetite varies between % of 2-3 meals a day. Unable to answer even simple yes/no questions appropriately. Pt often presents with blank, glazed over stare. Pain currently managed with Norco TID. Breathing is equal and unlabored with no reports of/observed SOB. Two weeks ago at SN pt did not appear well groomed. Writer spoke with staff and with HHA and arranged an alternative bathing schedule to ensure pt receives at least two baths/showers a week. KPS 30, FAST 7d  MED CHANGES: No recent med changes  PATIENT GOALS: Did not verbalize d/t condition.  FAMILY GOALS: Legal guardian stated keeping her comfortable is main goal.  SMART GOAL: Pt will appear better groomed with new bath schedule.  PLAN FOR NEXT 2 WEEKS: SNV 1x/wk for med/sx mgt, EOL education and emotional support. HHA 2x/wk for assist with completion of ADLs, presence, additional support. MSW 1-3x/mo for any resource connection needs and emotional support. MT for comfort.  for spiritual support.

## 2017-09-19 ENCOUNTER — TELEPHONE (OUTPATIENT)
Dept: GERIATRICS | Facility: CLINIC | Age: 82
End: 2017-09-19
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
## 2017-10-03 ENCOUNTER — TELEPHONE (OUTPATIENT)
Dept: GERIATRICS | Facility: CLINIC | Age: 82
End: 2017-10-03

## 2017-10-03 NOTE — TELEPHONE ENCOUNTER
Hospice Update-No Response Required  IDT NOTE BY: Jeni Bryson, RN  PRIMARY PROVIDER: Dr. Shields/Stacy Ceja NP  PRIMARY DX: Alzheimer's Disease  CO-MORBIDITIES: CAP, hx UTI, sepsis,   POLST: DNR/DNI  CURRENT STATUS: This is an 83 year old who resides at [facility] in [place]. Pt requires total assistance with all ADLs. Continues use of pat lift for transfers. Uses broda chair as main mode of transportation. Pt requires total hand feeding and mechanical soft diet d/t difficulty swallowing. Decrease in appetite as pt was able to eat % of meals and now only eating bites to 25%-staff report pt sticking tongue out with offers of bites. Pt mumbles-unable to answer any even simple yes/no questions appropriately. Keeps eyes closed during majority of visit. Pain managed with scheduled norco. Was having frequent loose stools last week. Immodium started PRN. KPS 30, FAST 7d  MED CHANGES: Immodium 2-4mg PRN-not to exceed 16mg/24 hours  PATIENT GOALS: Did not verbalize d/t condition.  FAMILY GOALS: Legal guardian stated keeping her comfortable is main goal.  SMART GOAL: Pt will appear better groomed with new bath schedule.  PLAN FOR NEXT 2 WEEKS: SNV 1x/wk for med/sx mgt, EOL education and emotional support. HHA 2x/wk for assist with completion of ADLs, presence, additional support. MSW 1-3x/mo for any resource connection needs and emotional support. MT for comfort.  for spiritual support.

## 2017-10-06 ENCOUNTER — DOCUMENTATION ONLY (OUTPATIENT)
Dept: CARE COORDINATION | Facility: CLINIC | Age: 82
End: 2017-10-06

## 2017-10-06 NOTE — PROGRESS NOTES
[facility] now requests orders and shares plan of care/discharge summaries for some patients through [employer].  Please REPLY TO THIS MESSAGE in order to give authorization for orders when needed.  This is considered a verbal order, you will still receive a faxed copy of orders for signature.  Thank you for your assistance in improving collaboration for our patients.    ORDER    Ok to admit to hospice.  Ok for hospice orders.  Ok for current meds and treatments. MSW eval and treat and to admit to hospice by hospice consult.   Pt currently enrolled in hospice but transferring to new [facility] with new medicare number.

## 2017-10-17 ENCOUNTER — NURSING HOME VISIT (OUTPATIENT)
Dept: GERIATRICS | Facility: CLINIC | Age: 82
End: 2017-10-17
Payer: COMMERCIAL

## 2017-10-17 ENCOUNTER — TELEPHONE (OUTPATIENT)
Dept: GERIATRICS | Facility: CLINIC | Age: 82
End: 2017-10-17

## 2017-10-17 VITALS
HEART RATE: 74 BPM | OXYGEN SATURATION: 96 % | RESPIRATION RATE: 16 BRPM | TEMPERATURE: 96.6 F | SYSTOLIC BLOOD PRESSURE: 135 MMHG | BODY MASS INDEX: 32.95 KG/M2 | DIASTOLIC BLOOD PRESSURE: 61 MMHG | WEIGHT: 198 LBS

## 2017-10-17 DIAGNOSIS — G30.1 LATE ONSET ALZHEIMER'S DISEASE WITHOUT BEHAVIORAL DISTURBANCE (H): Primary | ICD-10-CM

## 2017-10-17 DIAGNOSIS — F02.80 LATE ONSET ALZHEIMER'S DISEASE WITHOUT BEHAVIORAL DISTURBANCE (H): Primary | ICD-10-CM

## 2017-10-17 DIAGNOSIS — R62.7 ADULT FAILURE TO THRIVE: ICD-10-CM

## 2017-10-17 DIAGNOSIS — Z51.5 HOSPICE CARE PATIENT: ICD-10-CM

## 2017-10-17 PROCEDURE — 99309 SBSQ NF CARE MODERATE MDM 30: CPT | Mod: GW | Performed by: NURSE PRACTITIONER

## 2017-10-17 RX ORDER — LOPERAMIDE HCL 2 MG
2 CAPSULE ORAL PRN
COMMUNITY
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
## 2017-10-17 NOTE — TELEPHONE ENCOUNTER
Hospice Update-No Response Required  IDT NOTE BY: Jeni Bryson, RN  PRIMARY PROVIDER: Dr. Shields/Stacy Ceja NP  AGE: 83  PRIMARY DX: Alzheimer's Disease  CO-MORBIDITIES: CAP, hx UTI, sepsis,   POLST: DNR/DNI  RESIDENCE: [facility] in [place].   POLST: DNR/DNI  SNV FREQUENCY: 1x/week  HHA FREQUENCY: 2x/week  FUNCTIONAL STATUS: Pt requires total assistance with all ADLs. Continues use of pat lift for transfers. Uses broda chair as main mode of transportation.   NUTRITIONAL STATUS: Pt requires total hand feeding and mechanical soft diet d/t difficulty swallowing. Has been eating between % of 3 meals per day.   CURRENT STATUS: Pt's pain has been well managed with norco TID. Loose stools have been managed only used immodium x1 in the last two weeks. Pt unable to answer any even simply yes/no questions. Mumbles what is non-sensical during visits. Often has blank glazed over stare not making eye contact with writer. Writer continues to work with staff on a bathing schedule to keep pt well groomed. KPS 30, FAST 7d

## 2017-10-17 NOTE — TELEPHONE ENCOUNTER
IDT NOTE BY: Jeni Bryson, RN  PRIMARY PROVIDER: Dr. Shields/Stacy Ceja NP  AGE: 83  PRIMARY DX: Alzheimer's Disease  CO-MORBIDITIES: CAP, hx UTI, sepsis,   POLST: DNR/DNI  RESIDENCE: [facility] in [place].   POLST: DNR/DNI  SNV FREQUENCY: 1x/week  HHA FREQUENCY: 2x/week  FUNCTIONAL STATUS: Pt requires total assistance with all ADLs. Continues use of pat lift for transfers. Uses broda chair as main mode of transportation.   NUTRITIONAL STATUS: Pt requires total hand feeding and mechanical soft diet d/t difficulty swallowing. Has been eating between % of 3 meals per day.   CURRENT STATUS: Pt's pain has been well managed with norco TID. Loose stools have been managed only used immodium x1 in the last two weeks. Pt unable to answer any even simply yes/no questions. Mumbles what is non-sensical during visits. Often has blank glazed over stare not making eye contact with writer. Writer continues to work with staff on a bathing schedule to keep pt well groomed. KPS 30, FAST 7d

## 2017-10-31 ENCOUNTER — TELEPHONE (OUTPATIENT)
Dept: GERIATRICS | Facility: CLINIC | Age: 82
End: 2017-10-31

## 2017-10-31 NOTE — TELEPHONE ENCOUNTER
Hospice Update-No Response Required  IDT NOTE BY: Jeni Bryson, RN  PRIMARY PROVIDER: Dr. Shields/Stacy Ceja NP  AGE: 83  PRIMARY DX: Alzheimer's Disease  CO-MORBIDITIES: CAP, hx UTI, sepsis,   POLST: DNR/DNI  RESIDENCE: [facility] in [place].   POLST: DNR/DNI  SNV FREQUENCY: 1x/week  HHA FREQUENCY: 2x/week  FUNCTIONAL STATUS: Pt requires total assistance with all ADLs. Continues use of pat lift for transfers. Uses broda chair as main mode of transportation.   NUTRITIONAL STATUS: Pt requires total hand feeding and mechanical soft diet d/t difficulty swallowing. Has been eating between % of 3 meals per day. Has had a 6lb weight loss in the last month.   CURRENT STATUS: Pt unable to answer even simple questions appropriately. Essentially non-verbal. Has blank glazed over stare. Pain managed with scheduled norco TID. Skin currently intact. Pt having intermittent loose stools managed with PRN immodium. KPS 30, FAST 7d

## 2017-11-07 ENCOUNTER — MEDICAL CORRESPONDENCE (OUTPATIENT)
Dept: HEALTH INFORMATION MANAGEMENT | Facility: CLINIC | Age: 82
End: 2017-11-07

## 2017-12-12 VITALS
RESPIRATION RATE: 16 BRPM | TEMPERATURE: 95.6 F | SYSTOLIC BLOOD PRESSURE: 125 MMHG | HEART RATE: 69 BPM | OXYGEN SATURATION: 94 % | DIASTOLIC BLOOD PRESSURE: 75 MMHG | WEIGHT: 196.6 LBS | BODY MASS INDEX: 32.72 KG/M2

## 2017-12-12 NOTE — PROGRESS NOTES
[facility]    Chief Complaint   Patient presents with     prison Regulatory       HPI:    Nguyen Borja is a 83 year old  (1/11/1934), who is being seen today for a federally mandated E/M visit at [facility].  HPI information obtained from: facility staff and [facility] chart review.     Today's concerns are:  - Resident seen and examined.   - Reportedly sleep, appetite and BM are fine.  - RN / GNP has no concern today.   --------------------------------------------    - Past Medical, social, family histories, medications, and allergies reviewed and updated  - Medications reviewed: in the chart and EHR.   - Case Management:   I have reviewed the care plan and MDS and do agree with the plan. Patient's desire to return to the community is not present.  Information reviewed:  Medications, vital signs, orders, and nursing notes.    ROS:  Unobtainable secondary to cognitive impairment or aphasia.    Exam:  Vitals: /75  Pulse 69  Temp 95.6  F (35.3  C)  Resp 16  Wt 196 lb 9.6 oz (89.2 kg)  SpO2 94%  BMI 32.72 kg/m2  BMI= Body mass index is 32.72 kg/(m^2).      Lab/Diagnostic data:  Reviewed in the chart and EHR.  No new data to review for today.     ASSESSMENT/PLAN  Hypothyroidism due to acquired atrophy of thyroid  TSH   Date Value Ref Range Status   06/27/2017 1.05 0.40 - 4.00 mU/L Final   - on LT4, stable.     Hypertension, benign essential, goal below 140/90  BP Readings from Last 3 Encounters:   12/12/17 125/75   10/17/17 135/61   08/08/17 108/62   - diet controlled.     Hyperlipidemia LDL goal <130  Not on meds.     Primary osteoarthritis involving multiple joints  - analgesia optimal    Frailty  - Significant  Deficits requiring NH placement. Requiring extensive assistance from nursing. Up for meals only o/w spends the day resting in bed    Late onset Alzheimer's disease without behavioral disturbance  Hospice Care  - Continue to anticipate needs. Chronic  condition, ongoing decline expected.   -  Continue to provide redirection and reassurance as needed. Maintain safe living situation with goals focused on comfort.  - Symptoms managed by [facility] GNP and Hospice Team.    Orders:  - See above, otherwise, continue the rest of the current POC.     Electronically signed by:  Jhony Shields MD

## 2017-12-13 ENCOUNTER — NURSING HOME VISIT (OUTPATIENT)
Dept: GERIATRICS | Facility: CLINIC | Age: 82
End: 2017-12-13
Payer: MEDICARE

## 2017-12-13 DIAGNOSIS — F02.80 LATE ONSET ALZHEIMER'S DISEASE WITHOUT BEHAVIORAL DISTURBANCE (H): ICD-10-CM

## 2017-12-13 DIAGNOSIS — R54 FRAILTY: ICD-10-CM

## 2017-12-13 DIAGNOSIS — I10 HYPERTENSION, BENIGN ESSENTIAL, GOAL BELOW 140/90: ICD-10-CM

## 2017-12-13 DIAGNOSIS — E78.5 HYPERLIPIDEMIA LDL GOAL <130: ICD-10-CM

## 2017-12-13 DIAGNOSIS — M15.0 PRIMARY OSTEOARTHRITIS INVOLVING MULTIPLE JOINTS: ICD-10-CM

## 2017-12-13 DIAGNOSIS — E03.4 HYPOTHYROIDISM DUE TO ACQUIRED ATROPHY OF THYROID: Primary | ICD-10-CM

## 2017-12-13 DIAGNOSIS — G30.1 LATE ONSET ALZHEIMER'S DISEASE WITHOUT BEHAVIORAL DISTURBANCE (H): ICD-10-CM

## 2017-12-13 DIAGNOSIS — Z51.5 HOSPICE CARE PATIENT: ICD-10-CM

## 2017-12-13 PROCEDURE — 99309 SBSQ NF CARE MODERATE MDM 30: CPT | Mod: GW | Performed by: FAMILY MEDICINE

## 2018-02-06 ENCOUNTER — NURSING HOME VISIT (OUTPATIENT)
Dept: GERIATRICS | Facility: CLINIC | Age: 83
End: 2018-02-06
Payer: COMMERCIAL

## 2018-02-06 VITALS
TEMPERATURE: 95.8 F | DIASTOLIC BLOOD PRESSURE: 52 MMHG | BODY MASS INDEX: 32.18 KG/M2 | OXYGEN SATURATION: 96 % | WEIGHT: 193.4 LBS | SYSTOLIC BLOOD PRESSURE: 129 MMHG | HEART RATE: 79 BPM | RESPIRATION RATE: 18 BRPM

## 2018-02-06 DIAGNOSIS — R62.7 ADULT FAILURE TO THRIVE: ICD-10-CM

## 2018-02-06 DIAGNOSIS — I10 HYPERTENSION, BENIGN ESSENTIAL, GOAL BELOW 140/90: ICD-10-CM

## 2018-02-06 DIAGNOSIS — E03.4 HYPOTHYROIDISM DUE TO ACQUIRED ATROPHY OF THYROID: ICD-10-CM

## 2018-02-06 DIAGNOSIS — R54 FRAILTY: ICD-10-CM

## 2018-02-06 DIAGNOSIS — F02.818 DEMENTIA IN CONDITIONS CLASSIFIED ELSEWHERE WITH AGGRESSIVE BEHAVIOR (H): Primary | ICD-10-CM

## 2018-02-06 DIAGNOSIS — M15.0 PRIMARY OSTEOARTHRITIS INVOLVING MULTIPLE JOINTS: ICD-10-CM

## 2018-02-06 DIAGNOSIS — Z51.5 HOSPICE CARE PATIENT: ICD-10-CM

## 2018-02-06 PROCEDURE — 99309 SBSQ NF CARE MODERATE MDM 30: CPT | Performed by: NURSE PRACTITIONER
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
## 2018-03-15 ENCOUNTER — HOSPITAL LABORATORY (OUTPATIENT)
Facility: OTHER | Age: 83
End: 2018-03-15

## 2018-03-15 LAB — TSH SERPL DL<=0.005 MIU/L-ACNC: 4.41 MU/L (ref 0.4–4)

## 2018-04-10 VITALS
RESPIRATION RATE: 16 BRPM | DIASTOLIC BLOOD PRESSURE: 71 MMHG | TEMPERATURE: 94.2 F | WEIGHT: 192.2 LBS | SYSTOLIC BLOOD PRESSURE: 146 MMHG | BODY MASS INDEX: 31.98 KG/M2 | OXYGEN SATURATION: 97 % | HEART RATE: 67 BPM

## 2018-04-10 NOTE — PROGRESS NOTES
[facility]    Chief Complaint   Patient presents with     [facility] Regulatory       HPI:    Nguyen Borja is a 84 year old  (1/11/1934), who is being seen today for a federally mandated E/M visit at [facility].  HPI information obtained from: nursing staff report and [facility] chart review.     Today's concerns are:  - Resident seen and examined.   - Reportedly sleep, appetite and BM at baseline.   - RN / GNP has no concern today.   ------------------------------------------------  - - Past Medical, social, family histories, medications, and allergies reviewed and updated  - Medications reviewed: in the chart and EHR.   - Case Management:   I have reviewed the care plan and MDS and do agree with the plan. Patient's desire to return to the community is not present.  Information reviewed:  Medications, vital signs, orders, and nursing notes.    MEDICATIONS:  Current Outpatient Prescriptions   Medication Sig Dispense Refill     loperamide (IMODIUM) 2 MG capsule Take 2 mg by mouth as needed for diarrhea       atropine 1 % SOLN Place 4 drops under the tongue every 2 hours as needed for secretions       morphine sulfate, high concentrate, (ROXANOL-CONCENTRATED) 20 MG/ML concentrated solution Take 5 mg by mouth every 2 hours as needed for shortness of breath / dyspnea or moderate to severe pain       senna-docusate (SENOKOT-S;PERICOLACE) 8.6-50 MG per tablet Take 1 tablet by mouth 2 times daily as needed for constipation       HYDROcodone-acetaminophen (NORCO) 5-325 MG per tablet Take 1 tablet by mouth 3 times daily       chlorhexidine (PERIDEX) 0.12 % solution Take by mouth 2 times daily One oral strip by mouth two times a day for gingivitis.  Swab mouth/gums BID after oral cares.       nystatin (MYCOSTATIN) 209430 UNIT/GM POWD Apply topically daily as needed       levothyroxine (SYNTHROID) 75 MCG tablet Take 75 mcg by mouth every morning       ROS:  Unobtainable secondary to  cognitive impairment or aphasia.    Exam:  Vitals: /71  Pulse 67  Temp 94.2  F (34.6  C)  Resp 16  Wt 192 lb 3.2 oz (87.2 kg)  SpO2 97%  BMI 31.98 kg/m2  BMI= Body mass index is 31.98 kg/(m^2).  GENERAL APPEARANCE:  lethargic. no acute distress  RESP:  respiratory effort and palpation of chest normal, lungs clear to auscultation , posterior surface was not examined due to Resident position  CV:  Palpation and auscultation of heart done , regular rate and rhythm, no murmur, rub, or gallop, no edema  ABDOMEN:  normal bowel sounds, soft, nontender, no hepatosplenomegaly or other masses  SKIN:  Inspection of skin and subcutaneous tissue baseline, Palpation of skin and subcutaneous tissue baseline    Lab/Diagnostic data: Reviewed in the chart and EHR.        ASSESSMENT/PLAN  Hypothyroidism due to acquired atrophy of thyroid  TSH   Date Value Ref Range Status   03/15/2018 4.41 (H) 0.40 - 4.00 mU/L Final   - on LT4, stable.      Hypertension, benign essential,  BP Readings from Last 3 Encounters:   04/10/18 146/71   02/06/18 129/52   12/12/17 125/75   - diet controlled.      Hyperlipidemia LDL goal <130  Not on meds.      Primary osteoarthritis involving multiple joints  - analgesia optimal     Frailty  - Significant  Deficits requiring NH placement. Requiring extensive assistance from nursing. Up for meals only o/w spends the day resting in bed     Late onset Alzheimer's disease without behavioral disturbance  Hospice Care  - Continue to anticipate needs. Chronic condition, ongoing decline expected.   -  Continue to provide redirection and reassurance as needed. Maintain safe living situation with goals focused on comfort.  - Symptoms managed by [facility] GNP and Hospice Team.    Orders:  - - Resident seen and examined.   - Reports sleep, appetite and BM are fine.   - RN / GNP has no concern today.     Electronically signed by:  Jhony Shields MD

## 2018-04-11 ENCOUNTER — NURSING HOME VISIT (OUTPATIENT)
Dept: GERIATRICS | Facility: CLINIC | Age: 83
End: 2018-04-11
Payer: COMMERCIAL

## 2018-04-11 DIAGNOSIS — R54 FRAILTY: ICD-10-CM

## 2018-04-11 DIAGNOSIS — F02.818 DEMENTIA IN CONDITIONS CLASSIFIED ELSEWHERE WITH AGGRESSIVE BEHAVIOR (H): ICD-10-CM

## 2018-04-11 DIAGNOSIS — E78.5 HYPERLIPIDEMIA LDL GOAL <130: ICD-10-CM

## 2018-04-11 DIAGNOSIS — I10 HYPERTENSION, BENIGN ESSENTIAL, GOAL BELOW 140/90: ICD-10-CM

## 2018-04-11 DIAGNOSIS — E03.4 HYPOTHYROIDISM DUE TO ACQUIRED ATROPHY OF THYROID: Primary | ICD-10-CM

## 2018-04-11 DIAGNOSIS — Z51.5 HOSPICE CARE PATIENT: ICD-10-CM

## 2018-04-11 PROCEDURE — 99309 SBSQ NF CARE MODERATE MDM 30: CPT | Performed by: FAMILY MEDICINE

## 2018-04-11 NOTE — LETTER
4/11/2018        RE: Nguyen Borja  [facility]  [address]  [facility] [address]          [facility]    Chief Complaint   Patient presents with     [facility] Regulatory       HPI:    Nguyen Borja is a 84 year old  (1/11/1934), who is being seen today for a federally mandated E/M visit at [facility].  HPI information obtained from: nursing staff report and [facility] chart review.     Today's concerns are:  - Resident seen and examined.   - Reportedly sleep, appetite and BM at baseline.   - RN / GNP has no concern today.   ------------------------------------------------  - - Past Medical, social, family histories, medications, and allergies reviewed and updated  - Medications reviewed: in the chart and EHR.   - Case Management:   I have reviewed the care plan and MDS and do agree with the plan. Patient's desire to return to the community is not present.  Information reviewed:  Medications, vital signs, orders, and nursing notes.    MEDICATIONS:  Current Outpatient Prescriptions   Medication Sig Dispense Refill     loperamide (IMODIUM) 2 MG capsule Take 2 mg by mouth as needed for diarrhea       atropine 1 % SOLN Place 4 drops under the tongue every 2 hours as needed for secretions       morphine sulfate, high concentrate, (ROXANOL-CONCENTRATED) 20 MG/ML concentrated solution Take 5 mg by mouth every 2 hours as needed for shortness of breath / dyspnea or moderate to severe pain       senna-docusate (SENOKOT-S;PERICOLACE) 8.6-50 MG per tablet Take 1 tablet by mouth 2 times daily as needed for constipation       HYDROcodone-acetaminophen (NORCO) 5-325 MG per tablet Take 1 tablet by mouth 3 times daily       chlorhexidine (PERIDEX) 0.12 % solution Take by mouth 2 times daily One oral strip by mouth two times a day for gingivitis.  Swab mouth/gums BID after oral cares.       nystatin (MYCOSTATIN) 343802 UNIT/GM POWD Apply topically daily as needed        levothyroxine (SYNTHROID) 75 MCG tablet Take 75 mcg by mouth every morning       ROS:  Unobtainable secondary to cognitive impairment or aphasia.    Exam:  Vitals: /71  Pulse 67  Temp 94.2  F (34.6  C)  Resp 16  Wt 192 lb 3.2 oz (87.2 kg)  SpO2 97%  BMI 31.98 kg/m2  BMI= Body mass index is 31.98 kg/(m^2).  GENERAL APPEARANCE:  lethargic. no acute distress  RESP:  respiratory effort and palpation of chest normal, lungs clear to auscultation , posterior surface was not examined due to Resident position  CV:  Palpation and auscultation of heart done , regular rate and rhythm, no murmur, rub, or gallop, no edema  ABDOMEN:  normal bowel sounds, soft, nontender, no hepatosplenomegaly or other masses  SKIN:  Inspection of skin and subcutaneous tissue baseline, Palpation of skin and subcutaneous tissue baseline    Lab/Diagnostic data: Reviewed in the chart and EHR.        ASSESSMENT/PLAN  Hypothyroidism due to acquired atrophy of thyroid  TSH   Date Value Ref Range Status   03/15/2018 4.41 (H) 0.40 - 4.00 mU/L Final   - on LT4, stable.      Hypertension, benign essential,  BP Readings from Last 3 Encounters:   04/10/18 146/71   02/06/18 129/52   12/12/17 125/75   - diet controlled.      Hyperlipidemia LDL goal <130  Not on meds.      Primary osteoarthritis involving multiple joints  - analgesia optimal     Frailty  - Significant  Deficits requiring NH placement. Requiring extensive assistance from nursing. Up for meals only o/w spends the day resting in bed     Late onset Alzheimer's disease without behavioral disturbance  Hospice Care  - Continue to anticipate needs. Chronic condition, ongoing decline expected.   -  Continue to provide redirection and reassurance as needed. Maintain safe living situation with goals focused on comfort.  - Symptoms managed by [facility] GNP and Hospice Team.    Orders:  - - Resident seen and examined.   - Reports sleep, appetite and BM are fine.   - RN / GNP has no concern today.      Electronically signed by:  Jhony Shields MD        Sincerely,        Jhony Shields MD

## 2018-06-12 ENCOUNTER — NURSING HOME VISIT (OUTPATIENT)
Dept: GERIATRICS | Facility: CLINIC | Age: 83
End: 2018-06-12
Payer: COMMERCIAL

## 2018-06-12 VITALS
HEART RATE: 83 BPM | OXYGEN SATURATION: 95 % | RESPIRATION RATE: 19 BRPM | WEIGHT: 198 LBS | DIASTOLIC BLOOD PRESSURE: 85 MMHG | SYSTOLIC BLOOD PRESSURE: 109 MMHG | TEMPERATURE: 98 F | BODY MASS INDEX: 32.95 KG/M2

## 2018-06-12 DIAGNOSIS — M15.0 PRIMARY OSTEOARTHRITIS INVOLVING MULTIPLE JOINTS: ICD-10-CM

## 2018-06-12 DIAGNOSIS — F02.80 LATE ONSET ALZHEIMER'S DISEASE WITHOUT BEHAVIORAL DISTURBANCE (H): Primary | ICD-10-CM

## 2018-06-12 DIAGNOSIS — I10 HYPERTENSION, BENIGN ESSENTIAL, GOAL BELOW 140/90: ICD-10-CM

## 2018-06-12 DIAGNOSIS — S90.122A TRAUMATIC ECCHYMOSIS OF TOE OF LEFT FOOT, INITIAL ENCOUNTER: ICD-10-CM

## 2018-06-12 DIAGNOSIS — G30.1 LATE ONSET ALZHEIMER'S DISEASE WITHOUT BEHAVIORAL DISTURBANCE (H): Primary | ICD-10-CM

## 2018-06-12 DIAGNOSIS — E03.4 HYPOTHYROIDISM DUE TO ACQUIRED ATROPHY OF THYROID: ICD-10-CM

## 2018-06-12 DIAGNOSIS — Z51.5 HOSPICE CARE PATIENT: ICD-10-CM

## 2018-06-12 PROCEDURE — 99309 SBSQ NF CARE MODERATE MDM 30: CPT | Mod: GW | Performed by: NURSE PRACTITIONER
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
## 2018-07-02 ENCOUNTER — NURSING HOME VISIT (OUTPATIENT)
Dept: GERIATRICS | Facility: CLINIC | Age: 83
End: 2018-07-02
Payer: COMMERCIAL

## 2018-07-02 VITALS
HEART RATE: 69 BPM | OXYGEN SATURATION: 97 % | SYSTOLIC BLOOD PRESSURE: 115 MMHG | RESPIRATION RATE: 15 BRPM | WEIGHT: 189.2 LBS | DIASTOLIC BLOOD PRESSURE: 68 MMHG | TEMPERATURE: 97.9 F | BODY MASS INDEX: 31.52 KG/M2 | HEIGHT: 65 IN

## 2018-07-02 DIAGNOSIS — E78.5 HYPERLIPIDEMIA LDL GOAL <130: ICD-10-CM

## 2018-07-02 DIAGNOSIS — M15.0 PRIMARY OSTEOARTHRITIS INVOLVING MULTIPLE JOINTS: ICD-10-CM

## 2018-07-02 DIAGNOSIS — F02.80 LATE ONSET ALZHEIMER'S DISEASE WITHOUT BEHAVIORAL DISTURBANCE (H): Primary | ICD-10-CM

## 2018-07-02 DIAGNOSIS — E03.4 HYPOTHYROIDISM DUE TO ACQUIRED ATROPHY OF THYROID: ICD-10-CM

## 2018-07-02 DIAGNOSIS — I10 HYPERTENSION, BENIGN ESSENTIAL, GOAL BELOW 140/90: ICD-10-CM

## 2018-07-02 DIAGNOSIS — G30.1 LATE ONSET ALZHEIMER'S DISEASE WITHOUT BEHAVIORAL DISTURBANCE (H): Primary | ICD-10-CM

## 2018-07-02 PROCEDURE — 99318 ZZC ANNUAL NURSING FAC ASSESSMNT, STABLE: CPT | Mod: GW | Performed by: NURSE PRACTITIONER
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
## 2018-08-06 VITALS
WEIGHT: 189 LBS | SYSTOLIC BLOOD PRESSURE: 132 MMHG | TEMPERATURE: 98 F | HEIGHT: 65 IN | OXYGEN SATURATION: 97 % | HEART RATE: 84 BPM | DIASTOLIC BLOOD PRESSURE: 63 MMHG | BODY MASS INDEX: 31.49 KG/M2 | RESPIRATION RATE: 18 BRPM

## 2018-08-06 NOTE — PROGRESS NOTES
[facility]    Chief Complaint   Patient presents with     penitentiary Regulatory       [place] Medical Record Number:  [id number]    HPI:    Nguyen Borja is a 84 year old  (1/11/1934), who is being seen today for a federally mandated E/M visit at [facility] [place].  HPI information obtained from: facility staff and [facility] chart review.   Today's concerns are:  -  - Resident seen and examined. GNP reports Resident has been stable, and might graduate from the Hospice care.   - Reportedly sleep, appetite and BM are fine.   - RN has no concern today.   --------------------------------  - - Past Medical, social, family histories, medications, and allergies reviewed and updated  - Medications reviewed: in the chart and EHR.   - Case Management:   I have reviewed the care plan and MDS and do agree with the plan. Patient's desire to return to the community is not present.  Information reviewed:  Medications, vital signs, orders, and nursing notes.    MEDICATIONS:  Current Outpatient Prescriptions   Medication Sig Dispense Refill     ACETAMINOPHEN PO Take 650 mg by mouth every 4 hours as needed for pain       atropine 1 % SOLN Place 4 drops under the tongue every 2 hours as needed for secretions       chlorhexidine (PERIDEX) 0.12 % solution Take by mouth 2 times daily One oral strip by mouth two times a day for gingivitis.  Swab mouth/gums BID after oral cares.       HYDROcodone-acetaminophen (NORCO) 5-325 MG per tablet Take 1 tablet by mouth 3 times daily       levothyroxine (SYNTHROID) 75 MCG tablet Take 75 mcg by mouth every morning       loperamide (IMODIUM) 2 MG capsule Take 2 mg by mouth as needed for diarrhea       nystatin (MYCOSTATIN) 164033 UNIT/GM POWD Apply topically daily as needed       senna-docusate (SENOKOT-S;PERICOLACE) 8.6-50 MG per tablet Take 1 tablet by mouth 2 times daily as needed for constipation       Medications stopped since last EPIC medication reconciliation:  "    morphine sulfate, high concentrate, (ROXANOL-CONCENTRATED) 20 MG/ML concentrated solution      ROS:  Unobtainable secondary to cognitive impairment.     Exam:  Vitals: /63  Pulse 84  Temp 98  F (36.7  C)  Resp 18  Ht 5' 5\" (1.651 m)  Wt 189 lb (85.7 kg)  SpO2 97%  BMI 31.45 kg/m2  BMI= Body mass index is 31.45 kg/(m^2).  GENERAL APPEARANCE:  lethargic. no acute distress  RESP:  respiratory effort and palpation of chest normal, lungs clear to auscultation , posterior surface was not examined due to Resident position  CV:  Palpation and auscultation of heart done , regular rate and rhythm, no murmur, rub, or gallop, no edema  ABDOMEN:  normal bowel sounds, soft, nontender, no hepatosplenomegaly or other masses  SKIN:  Inspection of skin and subcutaneous tissue baseline, Palpation of skin and subcutaneous tissue baseline    Lab/Diagnostic data:  No new lab.     ASSESSMENT/PLAN  Hypothyroidism due to acquired atrophy of thyroid        TSH   Date Value Ref Range Status   03/15/2018 4.41 (H) 0.40 - 4.00 mU/L Final   - on LT4, stable.       Hypertension, benign essential: diet controlled.    Hyperlipidemia LDL goal <130: Not on meds.   Primary osteoarthritis involving multiple joints: analgesia optimal  Frailty  - Significant  Deficits requiring NH placement. Requiring extensive assistance from nursing. Up for meals only o/w spends the day resting in bed      Late onset Alzheimer's disease without behavioral disturbance  Hospice Care  - Continue to anticipate needs. Chronic condition, ongoing decline expected.   -  Continue to provide redirection and reassurance as needed. Maintain safe living situation with goals focused on comfort.  - Symptoms managed by [facility] GNP and Hospice Team.    Orders:  - See above, otherwise, continue the rest of the current POC.     Electronically signed by:  Jhony Shields MD    "

## 2018-08-08 ENCOUNTER — NURSING HOME VISIT (OUTPATIENT)
Dept: GERIATRICS | Facility: CLINIC | Age: 83
End: 2018-08-08
Payer: COMMERCIAL

## 2018-08-08 DIAGNOSIS — F02.80 LATE ONSET ALZHEIMER'S DISEASE WITHOUT BEHAVIORAL DISTURBANCE (H): ICD-10-CM

## 2018-08-08 DIAGNOSIS — Z51.5 HOSPICE CARE PATIENT: ICD-10-CM

## 2018-08-08 DIAGNOSIS — R54 FRAIL ELDERLY: ICD-10-CM

## 2018-08-08 DIAGNOSIS — E78.5 HYPERLIPIDEMIA LDL GOAL <130: ICD-10-CM

## 2018-08-08 DIAGNOSIS — G30.1 LATE ONSET ALZHEIMER'S DISEASE WITHOUT BEHAVIORAL DISTURBANCE (H): ICD-10-CM

## 2018-08-08 DIAGNOSIS — E03.4 HYPOTHYROIDISM DUE TO ACQUIRED ATROPHY OF THYROID: Primary | ICD-10-CM

## 2018-08-08 DIAGNOSIS — M17.0 PRIMARY OSTEOARTHRITIS OF BOTH KNEES: ICD-10-CM

## 2018-08-08 DIAGNOSIS — I10 HYPERTENSION, BENIGN ESSENTIAL, GOAL BELOW 140/90: ICD-10-CM

## 2018-08-08 PROCEDURE — 99309 SBSQ NF CARE MODERATE MDM 30: CPT | Mod: GW | Performed by: FAMILY MEDICINE

## 2018-08-08 NOTE — LETTER
8/8/2018        RE: Nguyen Borja  [facility]  [address]  [facility] [address]          [place] [facility]    Chief Complaint   Patient presents with     senior care Regulatory       [place] Medical Record Number:  [id number]    HPI:    Nguyen Borja is a 84 year old  (1/11/1934), who is being seen today for a federally mandated E/M visit at [facility] [place].  HPI information obtained from: facility staff and [facility] chart review.   Today's concerns are:  -  - Resident seen and examined. GNP reports Resident has been stable, and might graduate from the Hospice care.   - Reportedly sleep, appetite and BM are fine.   - RN has no concern today.   --------------------------------  - - Past Medical, social, family histories, medications, and allergies reviewed and updated  - Medications reviewed: in the chart and EHR.   - Case Management:   I have reviewed the care plan and MDS and do agree with the plan. Patient's desire to return to the community is not present.  Information reviewed:  Medications, vital signs, orders, and nursing notes.    MEDICATIONS:  Current Outpatient Prescriptions   Medication Sig Dispense Refill     ACETAMINOPHEN PO Take 650 mg by mouth every 4 hours as needed for pain       atropine 1 % SOLN Place 4 drops under the tongue every 2 hours as needed for secretions       chlorhexidine (PERIDEX) 0.12 % solution Take by mouth 2 times daily One oral strip by mouth two times a day for gingivitis.  Swab mouth/gums BID after oral cares.       HYDROcodone-acetaminophen (NORCO) 5-325 MG per tablet Take 1 tablet by mouth 3 times daily       levothyroxine (SYNTHROID) 75 MCG tablet Take 75 mcg by mouth every morning       loperamide (IMODIUM) 2 MG capsule Take 2 mg by mouth as needed for diarrhea       nystatin (MYCOSTATIN) 634275 UNIT/GM POWD Apply topically daily as needed       senna-docusate (SENOKOT-S;PERICOLACE) 8.6-50 MG per tablet Take 1 tablet by mouth  "2 times daily as needed for constipation       Medications stopped since last EPIC medication reconciliation:     morphine sulfate, high concentrate, (ROXANOL-CONCENTRATED) 20 MG/ML concentrated solution      ROS:  Unobtainable secondary to cognitive impairment.     Exam:  Vitals: /63  Pulse 84  Temp 98  F (36.7  C)  Resp 18  Ht 5' 5\" (1.651 m)  Wt 189 lb (85.7 kg)  SpO2 97%  BMI 31.45 kg/m2  BMI= Body mass index is 31.45 kg/(m^2).  GENERAL APPEARANCE:  lethargic. no acute distress  RESP:  respiratory effort and palpation of chest normal, lungs clear to auscultation , posterior surface was not examined due to Resident position  CV:  Palpation and auscultation of heart done , regular rate and rhythm, no murmur, rub, or gallop, no edema  ABDOMEN:  normal bowel sounds, soft, nontender, no hepatosplenomegaly or other masses  SKIN:  Inspection of skin and subcutaneous tissue baseline, Palpation of skin and subcutaneous tissue baseline    Lab/Diagnostic data:  No new lab.     ASSESSMENT/PLAN  Hypothyroidism due to acquired atrophy of thyroid        TSH   Date Value Ref Range Status   03/15/2018 4.41 (H) 0.40 - 4.00 mU/L Final   - on LT4, stable.       Hypertension, benign essential: diet controlled.    Hyperlipidemia LDL goal <130: Not on meds.   Primary osteoarthritis involving multiple joints: analgesia optimal  Frailty  - Significant  Deficits requiring NH placement. Requiring extensive assistance from nursing. Up for meals only o/w spends the day resting in bed      Late onset Alzheimer's disease without behavioral disturbance  Hospice Care  - Continue to anticipate needs. Chronic condition, ongoing decline expected.   -  Continue to provide redirection and reassurance as needed. Maintain safe living situation with goals focused on comfort.  - Symptoms managed by [facility] and Hospice Team.    Orders:  - See above, otherwise, continue the rest of the current POC.     Electronically signed by:  Jhony Shields, " MD        Sincerely,        Jhony Shields MD

## 2018-10-08 ENCOUNTER — NURSING HOME VISIT (OUTPATIENT)
Dept: GERIATRICS | Facility: CLINIC | Age: 83
End: 2018-10-08
Payer: COMMERCIAL

## 2018-10-08 VITALS
RESPIRATION RATE: 16 BRPM | DIASTOLIC BLOOD PRESSURE: 60 MMHG | OXYGEN SATURATION: 93 % | HEIGHT: 65 IN | HEART RATE: 69 BPM | TEMPERATURE: 97.8 F | BODY MASS INDEX: 31.32 KG/M2 | SYSTOLIC BLOOD PRESSURE: 144 MMHG | WEIGHT: 188 LBS

## 2018-10-08 DIAGNOSIS — G30.1 LATE ONSET ALZHEIMER'S DISEASE WITHOUT BEHAVIORAL DISTURBANCE (H): Primary | Chronic | ICD-10-CM

## 2018-10-08 DIAGNOSIS — Z51.5 HOSPICE CARE PATIENT: ICD-10-CM

## 2018-10-08 DIAGNOSIS — N18.30 CKD (CHRONIC KIDNEY DISEASE) STAGE 3, GFR 30-59 ML/MIN (H): ICD-10-CM

## 2018-10-08 DIAGNOSIS — E03.4 HYPOTHYROIDISM DUE TO ACQUIRED ATROPHY OF THYROID: Chronic | ICD-10-CM

## 2018-10-08 DIAGNOSIS — F02.80 LATE ONSET ALZHEIMER'S DISEASE WITHOUT BEHAVIORAL DISTURBANCE (H): Primary | Chronic | ICD-10-CM

## 2018-10-08 DIAGNOSIS — R54 FRAIL ELDERLY: ICD-10-CM

## 2018-10-08 DIAGNOSIS — M17.0 PRIMARY OSTEOARTHRITIS OF BOTH KNEES: ICD-10-CM

## 2018-10-08 DIAGNOSIS — I10 HYPERTENSION, BENIGN ESSENTIAL, GOAL BELOW 140/90: Chronic | ICD-10-CM

## 2018-10-08 PROBLEM — A41.9 SEPSIS (H): Status: RESOLVED | Noted: 2017-06-26 | Resolved: 2018-10-08

## 2018-10-08 PROBLEM — R79.89 ELEVATED LACTIC ACID LEVEL: Status: RESOLVED | Noted: 2017-06-26 | Resolved: 2018-10-08

## 2018-10-08 PROBLEM — N39.0 UTI (URINARY TRACT INFECTION): Status: RESOLVED | Noted: 2017-06-26 | Resolved: 2018-10-08

## 2018-10-08 PROBLEM — J18.9 CAP (COMMUNITY ACQUIRED PNEUMONIA): Status: RESOLVED | Noted: 2017-06-26 | Resolved: 2018-10-08

## 2018-10-08 PROCEDURE — 99308 SBSQ NF CARE LOW MDM 20: CPT | Mod: GW | Performed by: NURSE PRACTITIONER
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
## 2018-12-06 ASSESSMENT — MIFFLIN-ST. JEOR: SCORE: 1286.41

## 2018-12-11 VITALS
OXYGEN SATURATION: 94 % | SYSTOLIC BLOOD PRESSURE: 148 MMHG | HEIGHT: 65 IN | BODY MASS INDEX: 30.69 KG/M2 | DIASTOLIC BLOOD PRESSURE: 73 MMHG | RESPIRATION RATE: 18 BRPM | TEMPERATURE: 98.2 F | HEART RATE: 78 BPM | WEIGHT: 184.2 LBS

## 2018-12-11 NOTE — PROGRESS NOTES
[place] [facility]  Chief Complaint   Patient presents with     prison Regulatory   [place] Medical Record Number:  [id number]  Place of Service where encounter took place:  [facility] [[id number]]  HPI:    Nguyen Borja is a 84 year old  (1/11/1934), who is being seen today for a federally mandated E/M visit at [facility] [place].  HPI information obtained from: facility staff and [facility] chart review.   Today's concerns are:  -  - Resident seen and examined. - Reportedly sleep, appetite and BM are fine.   - RN  And GNP have no concern today.   --------------------------------  - - Past Medical, social, family histories, medications, and allergies reviewed and updated  - Medications reviewed: in the chart and EHR.   - Case Management:   I have reviewed the care plan and MDS and do agree with the plan. Patient's desire to return to the community is not present.  Information reviewed:  Medications, vital signs, orders, and nursing notes.    MEDICATIONS:  Current Outpatient Medications   Medication Sig Dispense Refill     ACETAMINOPHEN PO Take 650 mg by mouth every 4 hours as needed for pain       atropine 1 % SOLN Place 4 drops under the tongue every 2 hours as needed for secretions       chlorhexidine (PERIDEX) 0.12 % solution Take by mouth 2 times daily One oral strip by mouth two times a day for gingivitis.  Swab mouth/gums BID after oral cares.       HYDROcodone-acetaminophen (NORCO) 5-325 MG per tablet Take 1 tablet by mouth 3 times daily       levothyroxine (SYNTHROID) 75 MCG tablet Take 75 mcg by mouth every morning       loperamide (IMODIUM) 2 MG capsule Take 2 mg by mouth as needed for diarrhea       miconazole (MICATIN) 2 % AERP powder Apply topically as needed       nystatin (MYCOSTATIN) 304016 UNIT/GM POWD Apply topically daily as needed       senna-docusate (SENOKOT-S;PERICOLACE) 8.6-50 MG per tablet Take 1 tablet by mouth 2 times daily as  "needed for constipation     ROS: Unobtainable secondary to cognitive impairment.     Exam:  Vitals: /73   Pulse 78   Temp 98.2  F (36.8  C)   Resp 18   Ht 1.651 m (5' 5\")   Wt 83.6 kg (184 lb 3.2 oz)   SpO2 94%   BMI 30.65 kg/m    BMI= Body mass index is 30.65 kg/m .  GENERAL APPEARANCE:  awake no acute distress  RESP:  respiratory effort and palpation of chest normal, lungs clear to auscultation , no wheezing  CV:  Palpation and auscultation of heart done , regular rate and rhythm, no murmur, rub, or gallop, no edema  ABDOMEN:  normal bowel sounds, soft, nontender, no hepatosplenomegaly or other masses  SKIN:  Inspection of skin and subcutaneous tissue baseline, Palpation of skin and subcutaneous tissue baseline  MSK: no joint deformity noted on observation.   Neuro: no NFD appreciated   Psych: non verbal, pleasantly confused.     Lab/Diagnostic data:  No new lab.     ASSESSMENT/PLAN  Hypothyroidism due to acquired atrophy of thyroid: - on LT4, level 4.41. At baseline.     Hypertension, benign essential: diet controlled.    Hyperlipidemia LDL goal <130: Not on meds.   Primary osteoarthritis involving multiple joints: analgesia optimal  Frailty: - Significant  Deficits requiring NH placement. Requiring extensive assistance from nursing. Up for meals only o/w spends the day resting in bed  Late onset Alzheimer's disease without behavioral disturbance  Hospice Care  - Continue to anticipate needs. Chronic condition, ongoing decline expected.   -  Continue to provide redirection and reassurance as needed. Maintain safe living situation with goals focused on comfort.  - Symptoms managed by [facility] GNP and Hospice Team.    Orders:  - See above, otherwise, continue the rest of the current POC.     Electronically signed by:  Jhony Shields MD    "

## 2018-12-12 ENCOUNTER — NURSING HOME VISIT (OUTPATIENT)
Dept: GERIATRICS | Facility: CLINIC | Age: 83
End: 2018-12-12
Payer: COMMERCIAL

## 2018-12-12 DIAGNOSIS — R54 FRAIL ELDERLY: ICD-10-CM

## 2018-12-12 DIAGNOSIS — G30.1 LATE ONSET ALZHEIMER'S DISEASE WITHOUT BEHAVIORAL DISTURBANCE (H): ICD-10-CM

## 2018-12-12 DIAGNOSIS — I10 HYPERTENSION, BENIGN ESSENTIAL, GOAL BELOW 140/90: ICD-10-CM

## 2018-12-12 DIAGNOSIS — M17.0 PRIMARY OSTEOARTHRITIS OF BOTH KNEES: ICD-10-CM

## 2018-12-12 DIAGNOSIS — F02.80 LATE ONSET ALZHEIMER'S DISEASE WITHOUT BEHAVIORAL DISTURBANCE (H): ICD-10-CM

## 2018-12-12 DIAGNOSIS — E78.5 HYPERLIPIDEMIA LDL GOAL <130: ICD-10-CM

## 2018-12-12 DIAGNOSIS — E03.4 HYPOTHYROIDISM DUE TO ACQUIRED ATROPHY OF THYROID: Primary | ICD-10-CM

## 2018-12-12 DIAGNOSIS — Z51.5 HOSPICE CARE PATIENT: ICD-10-CM

## 2018-12-12 PROCEDURE — 99309 SBSQ NF CARE MODERATE MDM 30: CPT | Mod: GW | Performed by: FAMILY MEDICINE

## 2018-12-12 NOTE — LETTER
12/12/2018        RE: Nguyen Borja  [facility]  [address]  [facility] [address]        [place] [facility]  Chief Complaint   Patient presents with     FCI Regulatory   [place] Medical Record Number:  [id number]  Place of Service where encounter took place:  [facility] ([facility] [[id number]]  HPI:    Nguyen Borja is a 84 year old  (1/11/1934), who is being seen today for a federally mandated E/M visit at [facility] [place].  HPI information obtained from: facility staff and [facility] chart review.   Today's concerns are:  -  - Resident seen and examined. - Reportedly sleep, appetite and BM are fine.   - RN  And GNP have no concern today.   --------------------------------  - - Past Medical, social, family histories, medications, and allergies reviewed and updated  - Medications reviewed: in the chart and EHR.   - Case Management:   I have reviewed the care plan and MDS and do agree with the plan. Patient's desire to return to the community is not present.  Information reviewed:  Medications, vital signs, orders, and nursing notes.    MEDICATIONS:  Current Outpatient Medications   Medication Sig Dispense Refill     ACETAMINOPHEN PO Take 650 mg by mouth every 4 hours as needed for pain       atropine 1 % SOLN Place 4 drops under the tongue every 2 hours as needed for secretions       chlorhexidine (PERIDEX) 0.12 % solution Take by mouth 2 times daily One oral strip by mouth two times a day for gingivitis.  Swab mouth/gums BID after oral cares.       HYDROcodone-acetaminophen (NORCO) 5-325 MG per tablet Take 1 tablet by mouth 3 times daily       levothyroxine (SYNTHROID) 75 MCG tablet Take 75 mcg by mouth every morning       loperamide (IMODIUM) 2 MG capsule Take 2 mg by mouth as needed for diarrhea       miconazole (MICATIN) 2 % AERP powder Apply topically as needed       nystatin (MYCOSTATIN) 831782 UNIT/GM POWD Apply topically daily as  "needed       senna-docusate (SENOKOT-S;PERICOLACE) 8.6-50 MG per tablet Take 1 tablet by mouth 2 times daily as needed for constipation     ROS: Unobtainable secondary to cognitive impairment.     Exam:  Vitals: /73   Pulse 78   Temp 98.2  F (36.8  C)   Resp 18   Ht 1.651 m (5' 5\")   Wt 83.6 kg (184 lb 3.2 oz)   SpO2 94%   BMI 30.65 kg/m     BMI= Body mass index is 30.65 kg/m .  GENERAL APPEARANCE:  awake no acute distress  RESP:  respiratory effort and palpation of chest normal, lungs clear to auscultation , no wheezing  CV:  Palpation and auscultation of heart done , regular rate and rhythm, no murmur, rub, or gallop, no edema  ABDOMEN:  normal bowel sounds, soft, nontender, no hepatosplenomegaly or other masses  SKIN:  Inspection of skin and subcutaneous tissue baseline, Palpation of skin and subcutaneous tissue baseline  MSK: no joint deformity noted on observation.   Neuro: no NFD appreciated   Psych: non verbal, pleasantly confused.     Lab/Diagnostic data:  No new lab.     ASSESSMENT/PLAN  Hypothyroidism due to acquired atrophy of thyroid: - on LT4, level 4.41. At baseline.     Hypertension, benign essential: diet controlled.    Hyperlipidemia LDL goal <130: Not on meds.   Primary osteoarthritis involving multiple joints: analgesia optimal  Frailty: - Significant  Deficits requiring NH placement. Requiring extensive assistance from nursing. Up for meals only o/w spends the day resting in bed  Late onset Alzheimer's disease without behavioral disturbance  Hospice Care  - Continue to anticipate needs. Chronic condition, ongoing decline expected.   -  Continue to provide redirection and reassurance as needed. Maintain safe living situation with goals focused on comfort.  - Symptoms managed by [facility] GNP and Hospice Team.    Orders:  - See above, otherwise, continue the rest of the current POC.     Electronically signed by:  Jhony Shields MD        "

## 2018-12-12 NOTE — LETTER
12/12/2018        RE: Nguyen Borja  [facility]  [address]  [facility] [address]        [place] [facility]  Chief Complaint   Patient presents with     shelter Regulatory   [place] Medical Record Number:  [id number]  Place of Service where encounter took place:  [facility] ([facility] [[id number]]  HPI:    Nguyen Borja is a 84 year old  (1/11/1934), who is being seen today for a federally mandated E/M visit at [facility] [place].  HPI information obtained from: facility staff and [facility] chart review.   Today's concerns are:  -  - Resident seen and examined. - Reportedly sleep, appetite and BM are fine.   - RN  And GNP have no concern today.   --------------------------------  - - Past Medical, social, family histories, medications, and allergies reviewed and updated  - Medications reviewed: in the chart and EHR.   - Case Management:   I have reviewed the care plan and MDS and do agree with the plan. Patient's desire to return to the community is not present.  Information reviewed:  Medications, vital signs, orders, and nursing notes.    MEDICATIONS:  Current Outpatient Medications   Medication Sig Dispense Refill     ACETAMINOPHEN PO Take 650 mg by mouth every 4 hours as needed for pain       atropine 1 % SOLN Place 4 drops under the tongue every 2 hours as needed for secretions       chlorhexidine (PERIDEX) 0.12 % solution Take by mouth 2 times daily One oral strip by mouth two times a day for gingivitis.  Swab mouth/gums BID after oral cares.       HYDROcodone-acetaminophen (NORCO) 5-325 MG per tablet Take 1 tablet by mouth 3 times daily       levothyroxine (SYNTHROID) 75 MCG tablet Take 75 mcg by mouth every morning       loperamide (IMODIUM) 2 MG capsule Take 2 mg by mouth as needed for diarrhea       miconazole (MICATIN) 2 % AERP powder Apply topically as needed       nystatin (MYCOSTATIN) 553566 UNIT/GM POWD Apply topically daily as  "needed       senna-docusate (SENOKOT-S;PERICOLACE) 8.6-50 MG per tablet Take 1 tablet by mouth 2 times daily as needed for constipation     ROS: Unobtainable secondary to cognitive impairment.     Exam:  Vitals: /73   Pulse 78   Temp 98.2  F (36.8  C)   Resp 18   Ht 1.651 m (5' 5\")   Wt 83.6 kg (184 lb 3.2 oz)   SpO2 94%   BMI 30.65 kg/m     BMI= Body mass index is 30.65 kg/m .  GENERAL APPEARANCE:  awake no acute distress  RESP:  respiratory effort and palpation of chest normal, lungs clear to auscultation , no wheezing  CV:  Palpation and auscultation of heart done , regular rate and rhythm, no murmur, rub, or gallop, no edema  ABDOMEN:  normal bowel sounds, soft, nontender, no hepatosplenomegaly or other masses  SKIN:  Inspection of skin and subcutaneous tissue baseline, Palpation of skin and subcutaneous tissue baseline  MSK: no joint deformity noted on observation.   Neuro: no NFD appreciated   Psych: non verbal, pleasantly confused.     Lab/Diagnostic data:  No new lab.     ASSESSMENT/PLAN  Hypothyroidism due to acquired atrophy of thyroid: - on LT4, level 4.41. At baseline.     Hypertension, benign essential: diet controlled.    Hyperlipidemia LDL goal <130: Not on meds.   Primary osteoarthritis involving multiple joints: analgesia optimal  Frailty: - Significant  Deficits requiring NH placement. Requiring extensive assistance from nursing. Up for meals only o/w spends the day resting in bed  Late onset Alzheimer's disease without behavioral disturbance  Hospice Care  - Continue to anticipate needs. Chronic condition, ongoing decline expected.   -  Continue to provide redirection and reassurance as needed. Maintain safe living situation with goals focused on comfort.  - Symptoms managed by [facility] and Hospice Team.    Orders:  - See above, otherwise, continue the rest of the current POC.     Electronically signed by:  Jhony Shields MD        Sincerely,        Jhony Shields MD    "

## 2019-01-01 ENCOUNTER — NURSING HOME VISIT (OUTPATIENT)
Dept: GERIATRICS | Facility: CLINIC | Age: 84
End: 2019-01-01
Payer: COMMERCIAL

## 2019-01-01 VITALS
TEMPERATURE: 97.5 F | OXYGEN SATURATION: 99 % | WEIGHT: 170 LBS | SYSTOLIC BLOOD PRESSURE: 134 MMHG | BODY MASS INDEX: 28.32 KG/M2 | HEIGHT: 65 IN | HEART RATE: 66 BPM | RESPIRATION RATE: 16 BRPM | DIASTOLIC BLOOD PRESSURE: 55 MMHG

## 2019-01-01 DIAGNOSIS — Z51.5 HOSPICE CARE PATIENT: ICD-10-CM

## 2019-01-01 DIAGNOSIS — R54 FRAILTY: ICD-10-CM

## 2019-01-01 DIAGNOSIS — M15.0 PRIMARY OSTEOARTHRITIS INVOLVING MULTIPLE JOINTS: ICD-10-CM

## 2019-01-01 DIAGNOSIS — E78.2 MIXED HYPERLIPIDEMIA: ICD-10-CM

## 2019-01-01 DIAGNOSIS — F02.80 LATE ONSET ALZHEIMER'S DISEASE WITHOUT BEHAVIORAL DISTURBANCE (H): Primary | ICD-10-CM

## 2019-01-01 DIAGNOSIS — G30.1 LATE ONSET ALZHEIMER'S DISEASE WITHOUT BEHAVIORAL DISTURBANCE (H): Primary | ICD-10-CM

## 2019-01-01 DIAGNOSIS — I10 ESSENTIAL HYPERTENSION: ICD-10-CM

## 2019-01-01 DIAGNOSIS — E03.4 HYPOTHYROIDISM DUE TO ACQUIRED ATROPHY OF THYROID: ICD-10-CM

## 2019-01-01 PROCEDURE — 99309 SBSQ NF CARE MODERATE MDM 30: CPT | Mod: GW | Performed by: FAMILY MEDICINE

## 2019-01-01 ASSESSMENT — MIFFLIN-ST. JEOR: SCORE: 1216.99

## 2019-01-17 ENCOUNTER — HOSPITAL LABORATORY (OUTPATIENT)
Facility: OTHER | Age: 84
End: 2019-01-17

## 2019-01-17 LAB — TSH SERPL DL<=0.005 MIU/L-ACNC: 2.24 MU/L (ref 0.4–4)

## 2019-02-04 ENCOUNTER — NURSING HOME VISIT (OUTPATIENT)
Dept: GERIATRICS | Facility: CLINIC | Age: 84
End: 2019-02-04
Payer: COMMERCIAL

## 2019-02-04 VITALS
TEMPERATURE: 97.8 F | OXYGEN SATURATION: 97 % | WEIGHT: 185 LBS | HEART RATE: 71 BPM | RESPIRATION RATE: 18 BRPM | BODY MASS INDEX: 30.79 KG/M2 | DIASTOLIC BLOOD PRESSURE: 89 MMHG | SYSTOLIC BLOOD PRESSURE: 134 MMHG

## 2019-02-04 DIAGNOSIS — E03.4 HYPOTHYROIDISM DUE TO ACQUIRED ATROPHY OF THYROID: ICD-10-CM

## 2019-02-04 DIAGNOSIS — I10 HYPERTENSION, BENIGN ESSENTIAL, GOAL BELOW 140/90: ICD-10-CM

## 2019-02-04 DIAGNOSIS — F02.80 LATE ONSET ALZHEIMER'S DISEASE WITHOUT BEHAVIORAL DISTURBANCE (H): Primary | ICD-10-CM

## 2019-02-04 DIAGNOSIS — M15.0 PRIMARY OSTEOARTHRITIS INVOLVING MULTIPLE JOINTS: ICD-10-CM

## 2019-02-04 DIAGNOSIS — Z51.5 HOSPICE CARE PATIENT: ICD-10-CM

## 2019-02-04 DIAGNOSIS — R54 FRAIL ELDERLY: ICD-10-CM

## 2019-02-04 DIAGNOSIS — G30.1 LATE ONSET ALZHEIMER'S DISEASE WITHOUT BEHAVIORAL DISTURBANCE (H): Primary | ICD-10-CM

## 2019-02-04 PROCEDURE — 99309 SBSQ NF CARE MODERATE MDM 30: CPT | Performed by: NURSE PRACTITIONER
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
## 2019-04-09 VITALS
HEART RATE: 76 BPM | OXYGEN SATURATION: 92 % | SYSTOLIC BLOOD PRESSURE: 128 MMHG | BODY MASS INDEX: 28.96 KG/M2 | DIASTOLIC BLOOD PRESSURE: 79 MMHG | TEMPERATURE: 97.9 F | WEIGHT: 174 LBS | RESPIRATION RATE: 18 BRPM

## 2019-04-09 NOTE — PROGRESS NOTES
[place] [facility]  Chief Complaint   Patient presents with     correction Regulatory     [place] Medical Record Number:  [id number]  Place of Service where encounter took place:  [facility] [[id number]]      HPI:    Nguyen Borja  is 85 year old (1/11/1934), who is being seen today for a federally mandated E/M visit.  HPI information obtained from: facility chart records, facility staff and DNP, and [facility] chart review.     Today's concerns are:  - - Resident seen and examined.    Reportedly continued to be at baseline, comfortable, sleep, appetite and BM are at baseline, pain manageable. .   - RN has no concern today.   - GNP has no concern  --------------------------------  - - Past Medical, social, family histories, medications, and allergies reviewed and updated  - Medications reviewed: in the chart and EHR.   - Case Management:   I have reviewed the care plan and MDS and do agree with the plan. Patient's desire to return to the community is not present.  Information reviewed:  Medications, vital signs, orders, and nursing notes.  ================================================================  MEDICATIONS:  Current Outpatient Medications   Medication Sig Dispense Refill     ACETAMINOPHEN PO Take 650 mg by mouth every 4 hours as needed for pain       atropine 1 % SOLN Place 4 drops under the tongue every 2 hours as needed for secretions       chlorhexidine (PERIDEX) 0.12 % solution Take by mouth 2 times daily One oral strip by mouth two times a day for gingivitis.  Swab mouth/gums BID after oral cares.       HYDROcodone-acetaminophen (NORCO) 5-325 MG per tablet Take 1 tablet by mouth 3 times daily       levothyroxine (SYNTHROID) 75 MCG tablet Take 75 mcg by mouth every morning       loperamide (IMODIUM) 2 MG capsule Take 2 mg by mouth as needed for diarrhea       miconazole (MICATIN) 2 % AERP powder Apply topically as needed       nystatin (MYCOSTATIN)  887593 UNIT/GM POWD Apply topically daily as needed       senna-docusate (SENOKOT-S;PERICOLACE) 8.6-50 MG per tablet Take 1 tablet by mouth 2 times daily as needed for constipation       ROS:  Unobtainable secondary to cognitive impairment.     Vitals:  /79   Pulse 76   Temp 97.9  F (36.6  C)   Resp 18   Wt 78.9 kg (174 lb)   SpO2 92%   BMI 28.96 kg/m    Body mass index is 28.96 kg/m .  Exam:   GENERAL APPEARANCE:  awake no acute distress  RESP:  lungs clear to auscultation , no wheezing  CV:  S1S2 audible,  regular rate and rhythm, no murmur, rub, or gallop, no edema  ABDOMEN:  normal bowel sounds, soft, nontender, no palpable mass  SKIN:  Inspection of skin and subcutaneous tissue baseline, Palpation of skin and subcutaneous tissue baseline  MSK: no joint deformity noted on observation.   Neuro: no NFD appreciated   Psych: non verbal, pleasantly confused.     Lab/Diagnostic data:   [facility] by me today.     ASSESSMENT/PLAN  Hypertension, benign essential: diet controlled.    Hyperlipidemia LDL goal <130: Not on meds.   Primary osteoarthritis involving multiple joints: analgesia optimal  Frailty: - Significant  Deficits requiring NH placement. Requiring extensive assistance from nursing. Up for meals only o/w spends the day resting in bed  Hypothyroidism due to acquired atrophy of thyroid: - on LT4, At baseline.    Late onset Alzheimer's disease without behavioral disturbance  Hospice Care  - Continue to anticipate needs. Chronic condition, ongoing decline expected.   -  Continue to provide redirection and reassurance as needed. Maintain safe living situation with goals focused on comfort.  - Symptoms managed by [facility]NP and Hospice Team.     Orders:  - See above, otherwise, continue the rest of the current POC.       Electronically signed by:  Jhony Shields MD

## 2019-04-10 ENCOUNTER — NURSING HOME VISIT (OUTPATIENT)
Dept: GERIATRICS | Facility: CLINIC | Age: 84
End: 2019-04-10
Payer: COMMERCIAL

## 2019-04-10 DIAGNOSIS — M15.0 PRIMARY OSTEOARTHRITIS INVOLVING MULTIPLE JOINTS: Primary | ICD-10-CM

## 2019-04-10 DIAGNOSIS — I10 HYPERTENSION, BENIGN ESSENTIAL, GOAL BELOW 140/90: ICD-10-CM

## 2019-04-10 DIAGNOSIS — F02.80 LATE ONSET ALZHEIMER'S DISEASE WITHOUT BEHAVIORAL DISTURBANCE (H): ICD-10-CM

## 2019-04-10 DIAGNOSIS — G30.1 LATE ONSET ALZHEIMER'S DISEASE WITHOUT BEHAVIORAL DISTURBANCE (H): ICD-10-CM

## 2019-04-10 DIAGNOSIS — E78.5 HYPERLIPIDEMIA LDL GOAL <130: ICD-10-CM

## 2019-04-10 DIAGNOSIS — E03.4 HYPOTHYROIDISM DUE TO ACQUIRED ATROPHY OF THYROID: ICD-10-CM

## 2019-04-10 DIAGNOSIS — Z51.5 HOSPICE CARE PATIENT: ICD-10-CM

## 2019-04-10 PROCEDURE — 99309 SBSQ NF CARE MODERATE MDM 30: CPT | Mod: GW | Performed by: FAMILY MEDICINE

## 2019-04-10 NOTE — LETTER
April 19, 2019      Nguyen Borja  [facility]  [address]  [facility] [address]        Dear MsZackHuong,    We are writing to inform you of your test results.    {results letter list:861502}    No results found from the In Basket message.    If you have any questions or concerns, please call the clinic at the number listed above.       Sincerely,        Jhony Shields MD

## 2019-04-10 NOTE — LETTER
4/10/2019        RE: Nguyen Borja  [facility]  [address]  [facility] [address]        [place] [facility]  Chief Complaint   Patient presents with     alf Regulatory     [place] Medical Record Number:  [id number]  Place of Service where encounter took place:  [facility] [[id number]]      HPI:    Nguyen Borja  is 85 year old (1/11/1934), who is being seen today for a federally mandated E/M visit.  HPI information obtained from: facility chart records, facility staff and DNP, and [facility] chart review.     Today's concerns are:  - - Resident seen and examined.    Reportedly continued to be at baseline, comfortable, sleep, appetite and BM are at baseline, pain manageable. .   - RN has no concern today.   - GNP has no concern  --------------------------------  - - Past Medical, social, family histories, medications, and allergies reviewed and updated  - Medications reviewed: in the chart and EHR.   - Case Management:   I have reviewed the care plan and MDS and do agree with the plan. Patient's desire to return to the community is not present.  Information reviewed:  Medications, vital signs, orders, and nursing notes.  ================================================================  MEDICATIONS:  Current Outpatient Medications   Medication Sig Dispense Refill     ACETAMINOPHEN PO Take 650 mg by mouth every 4 hours as needed for pain       atropine 1 % SOLN Place 4 drops under the tongue every 2 hours as needed for secretions       chlorhexidine (PERIDEX) 0.12 % solution Take by mouth 2 times daily One oral strip by mouth two times a day for gingivitis.  Swab mouth/gums BID after oral cares.       HYDROcodone-acetaminophen (NORCO) 5-325 MG per tablet Take 1 tablet by mouth 3 times daily       levothyroxine (SYNTHROID) 75 MCG tablet Take 75 mcg by mouth every morning       loperamide (IMODIUM) 2 MG capsule Take 2 mg by mouth as needed  for diarrhea       miconazole (MICATIN) 2 % AERP powder Apply topically as needed       nystatin (MYCOSTATIN) 511381 UNIT/GM POWD Apply topically daily as needed       senna-docusate (SENOKOT-S;PERICOLACE) 8.6-50 MG per tablet Take 1 tablet by mouth 2 times daily as needed for constipation       ROS:  Unobtainable secondary to cognitive impairment.     Vitals:  /79   Pulse 76   Temp 97.9  F (36.6  C)   Resp 18   Wt 78.9 kg (174 lb)   SpO2 92%   BMI 28.96 kg/m     Body mass index is 28.96 kg/m .  Exam:   GENERAL APPEARANCE:  awake no acute distress  RESP:  lungs clear to auscultation , no wheezing  CV:  S1S2 audible,  regular rate and rhythm, no murmur, rub, or gallop, no edema  ABDOMEN:  normal bowel sounds, soft, nontender, no palpable mass  SKIN:  Inspection of skin and subcutaneous tissue baseline, Palpation of skin and subcutaneous tissue baseline  MSK: no joint deformity noted on observation.   Neuro: no NFD appreciated   Psych: non verbal, pleasantly confused.     Lab/Diagnostic data:   Recent labs in [facility] reviewed by me today.     ASSESSMENT/PLAN  Hypertension, benign essential: diet controlled.    Hyperlipidemia LDL goal <130: Not on meds.   Primary osteoarthritis involving multiple joints: analgesia optimal  Frailty: - Significant  Deficits requiring NH placement. Requiring extensive assistance from nursing. Up for meals only o/w spends the day resting in bed  Hypothyroidism due to acquired atrophy of thyroid: - on LT4, At baseline.    Late onset Alzheimer's disease without behavioral disturbance  Hospice Care  - Continue to anticipate needs. Chronic condition, ongoing decline expected.   -  Continue to provide redirection and reassurance as needed. Maintain safe living situation with goals focused on comfort.  - Symptoms managed by [facility] and Hospice Team.     Orders:  - See above, otherwise, continue the rest of the current POC.       Electronically signed by:  Jhony Shields  MD            Sincerely,        Jhony Shields MD

## 2019-06-09 VITALS
WEIGHT: 179 LBS | DIASTOLIC BLOOD PRESSURE: 72 MMHG | BODY MASS INDEX: 29.79 KG/M2 | RESPIRATION RATE: 16 BRPM | HEART RATE: 80 BPM | TEMPERATURE: 98.4 F | OXYGEN SATURATION: 94 % | SYSTOLIC BLOOD PRESSURE: 110 MMHG
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
## 2019-06-10 ENCOUNTER — NURSING HOME VISIT (OUTPATIENT)
Dept: GERIATRICS | Facility: CLINIC | Age: 84
End: 2019-06-10
Payer: COMMERCIAL

## 2019-06-10 DIAGNOSIS — N18.30 CKD (CHRONIC KIDNEY DISEASE) STAGE 3, GFR 30-59 ML/MIN (H): ICD-10-CM

## 2019-06-10 DIAGNOSIS — E03.4 HYPOTHYROIDISM DUE TO ACQUIRED ATROPHY OF THYROID: ICD-10-CM

## 2019-06-10 DIAGNOSIS — M15.0 PRIMARY OSTEOARTHRITIS INVOLVING MULTIPLE JOINTS: ICD-10-CM

## 2019-06-10 DIAGNOSIS — Z51.5 HOSPICE CARE PATIENT: ICD-10-CM

## 2019-06-10 DIAGNOSIS — G30.1 LATE ONSET ALZHEIMER'S DISEASE WITHOUT BEHAVIORAL DISTURBANCE (H): Primary | ICD-10-CM

## 2019-06-10 DIAGNOSIS — F02.80 LATE ONSET ALZHEIMER'S DISEASE WITHOUT BEHAVIORAL DISTURBANCE (H): Primary | ICD-10-CM

## 2019-06-10 DIAGNOSIS — R54 FRAIL ELDERLY: ICD-10-CM

## 2019-06-10 PROCEDURE — 99309 SBSQ NF CARE MODERATE MDM 30: CPT | Mod: GW | Performed by: NURSE PRACTITIONER
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
## 2019-06-13 ENCOUNTER — HOSPITAL LABORATORY (OUTPATIENT)
Facility: OTHER | Age: 84
End: 2019-06-13

## 2019-06-13 LAB — TSH SERPL DL<=0.005 MIU/L-ACNC: 1.56 MU/L (ref 0.4–4)

## 2019-08-15 ASSESSMENT — MIFFLIN-ST. JEOR: SCORE: 1249.65

## 2019-08-16 ENCOUNTER — NURSING HOME VISIT (OUTPATIENT)
Dept: GERIATRICS | Facility: CLINIC | Age: 84
End: 2019-08-16
Payer: COMMERCIAL

## 2019-08-16 VITALS
HEART RATE: 70 BPM | SYSTOLIC BLOOD PRESSURE: 121 MMHG | OXYGEN SATURATION: 95 % | WEIGHT: 177.2 LBS | RESPIRATION RATE: 16 BRPM | TEMPERATURE: 97.5 F | HEIGHT: 65 IN | DIASTOLIC BLOOD PRESSURE: 89 MMHG | BODY MASS INDEX: 29.52 KG/M2

## 2019-08-16 DIAGNOSIS — Z51.5 HOSPICE CARE PATIENT: ICD-10-CM

## 2019-08-16 DIAGNOSIS — F02.80 LATE ONSET ALZHEIMER'S DISEASE WITHOUT BEHAVIORAL DISTURBANCE (H): Primary | ICD-10-CM

## 2019-08-16 DIAGNOSIS — R54 FRAILTY: ICD-10-CM

## 2019-08-16 DIAGNOSIS — I10 HYPERTENSION, BENIGN ESSENTIAL, GOAL BELOW 140/90: ICD-10-CM

## 2019-08-16 DIAGNOSIS — G30.1 LATE ONSET ALZHEIMER'S DISEASE WITHOUT BEHAVIORAL DISTURBANCE (H): Primary | ICD-10-CM

## 2019-08-16 DIAGNOSIS — M15.0 PRIMARY OSTEOARTHRITIS INVOLVING MULTIPLE JOINTS: ICD-10-CM

## 2019-08-16 PROCEDURE — 99309 SBSQ NF CARE MODERATE MDM 30: CPT | Mod: GW | Performed by: NURSE PRACTITIONER

## 2019-08-16 PROCEDURE — 99207 ZZC CDG-EXAM COMPONENT: MEETS DETAILED - DOWN CODED: CPT | Performed by: NURSE PRACTITIONER

## 2019-08-16 RX ORDER — MORPHINE SULFATE 20 MG/5ML
2.5 SOLUTION ORAL EVERY 6 HOURS
COMMUNITY
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
## 2019-10-08 VITALS
HEART RATE: 75 BPM | TEMPERATURE: 97.5 F | BODY MASS INDEX: 29.09 KG/M2 | OXYGEN SATURATION: 94 % | DIASTOLIC BLOOD PRESSURE: 66 MMHG | WEIGHT: 174.6 LBS | SYSTOLIC BLOOD PRESSURE: 110 MMHG | HEIGHT: 65 IN | RESPIRATION RATE: 16 BRPM

## 2019-10-08 ASSESSMENT — MIFFLIN-ST. JEOR: SCORE: 1237.86

## 2019-10-08 NOTE — PROGRESS NOTES
[place] [facility]  Chief Complaint   Patient presents with     custodial Regulatory     [place] Medical Record Number:  [id number]  Place [facility] [[id number]]      HPI:    Nguyen Borja  is 85 year old (1/11/1934), who is being seen today for a federally mandated E/M visit.  HPI information obtained from: facility chart records, facility staff, and [facility] chart review.     Today's concerns are:  - - Resident seen and examined.    Reportedly continued to be at baseline, comfortable, sleep, appetite and BM are at baseline, pain manageable. .   - RN has no concern today.   - GNP has no concern  --------------------------------  - - Past Medical, social, family histories, medications, and allergies reviewed and updated  - Medications reviewed: in the chart and EHR.   - Case Management:   I have reviewed the care plan and MDS and do agree with the plan. Patient's desire to return to the community is not present.  Information reviewed:  Medications, vital signs, orders, and nursing notes.  ================================================================  MEDICATIONS:  Current Outpatient Medications   Medication Sig Dispense Refill     atropine 1 % SOLN Place 4 drops under the tongue every 2 hours as needed for secretions       HYDROcodone-acetaminophen (NORCO) 5-325 MG per tablet Take 1 tablet by mouth 3 times daily       levothyroxine (SYNTHROID) 75 MCG tablet Take 75 mcg by mouth every morning       loperamide (IMODIUM) 2 MG capsule Take 2 mg by mouth as needed for diarrhea       morphine sulfate 20 MG/5ML SOLN Take 5 mg by mouth every 2 hours as needed       nystatin (MYCOSTATIN) 047356 UNIT/GM POWD Apply topically daily as needed       ACETAMINOPHEN PO Take 650 mg by mouth every 4 hours as needed for pain       chlorhexidine (PERIDEX) 0.12 % solution Take by mouth 2 times daily One oral strip by mouth two times a day  "for gingivitis.  Swab mouth/gums BID after oral cares.       miconazole (MICATIN) 2 % AERP powder Apply topically as needed       senna-docusate (SENOKOT-S;PERICOLACE) 8.6-50 MG per tablet Take 1 tablet by mouth 2 times daily as needed for constipation       ROS: Unobtainable secondary to cognitive impairment.     Vitals:  /66   Pulse 75   Temp 97.5  F (36.4  C)   Resp 16   Ht 1.651 m (5' 5\")   Wt 79.2 kg (174 lb 9.6 oz)   SpO2 94%   BMI 29.05 kg/m    Body mass index is 29.05 kg/m .  Exam:   GENERAL APPEARANCE:  awake no acute distress  RESP:  lungs clear to auscultation , no wheezing  CV:  S1S2 audible,  regular rate and rhythm, no murmur, rub, or gallop, no edema  ABDOMEN:  normal bowel sounds, soft, nontender, no palpable mass  SKIN:  Inspection of skin and subcutaneous tissue baseline, Palpation of skin and subcutaneous tissue baseline  MSK: no joint deformity noted on observation.   Neuro: no NFD appreciated   Psych: non verbal, pleasantly confused.     Lab/Diagnostic data: Recent labs in [place] reviewed by me today.     ASSESSMENT/PLAN  Hypertension, benign essential: diet controlled.     Hyperlipidemia LDL goal <130: Not on meds.     Primary osteoarthritis involving multiple joints: analgesia optimal    Frailty: - Significant  Deficits requiring NH placement. Requiring extensive assistance from nursing. Up for meals only o/w spends the day resting in bed    Hypothyroidism due to acquired atrophy of thyroid: - on LT4, At baseline.      Late onset Alzheimer's disease without behavioral disturbance  Hospice Care  - Continue to anticipate needs. Chronic condition, ongoing decline expected.   -  Continue to provide redirection and reassurance as needed. Maintain safe living situation with goals focused on comfort.  - Symptoms managed by [facility] and Hospice Team.     Orders:  - See above, otherwise, continue the rest of the current POC.       Electronically signed by:  Jhony Shields MD        "

## 2019-10-09 ENCOUNTER — NURSING HOME VISIT (OUTPATIENT)
Dept: GERIATRICS | Facility: CLINIC | Age: 84
End: 2019-10-09
Payer: COMMERCIAL

## 2019-10-09 DIAGNOSIS — M15.0 PRIMARY OSTEOARTHRITIS INVOLVING MULTIPLE JOINTS: ICD-10-CM

## 2019-10-09 DIAGNOSIS — F02.80 LATE ONSET ALZHEIMER'S DISEASE WITHOUT BEHAVIORAL DISTURBANCE (H): ICD-10-CM

## 2019-10-09 DIAGNOSIS — R54 FRAILTY: ICD-10-CM

## 2019-10-09 DIAGNOSIS — G30.1 LATE ONSET ALZHEIMER'S DISEASE WITHOUT BEHAVIORAL DISTURBANCE (H): ICD-10-CM

## 2019-10-09 DIAGNOSIS — Z51.5 HOSPICE CARE PATIENT: ICD-10-CM

## 2019-10-09 DIAGNOSIS — E78.5 HYPERLIPIDEMIA LDL GOAL <130: ICD-10-CM

## 2019-10-09 DIAGNOSIS — E03.4 HYPOTHYROIDISM DUE TO ACQUIRED ATROPHY OF THYROID: ICD-10-CM

## 2019-10-09 DIAGNOSIS — I10 ESSENTIAL HYPERTENSION: Primary | ICD-10-CM

## 2019-10-09 PROCEDURE — 99309 SBSQ NF CARE MODERATE MDM 30: CPT | Mod: GW | Performed by: FAMILY MEDICINE

## 2019-10-09 NOTE — LETTER
10/9/2019        RE: Nguyen Borja  [facility]  [address]  [facility] [address]        [place] [facility]  Chief Complaint   Patient presents with     half-way Regulatory     [place] Medical Record Number:  [id number]  Place of Service where encounter took place[facility] [[id number]]      HPI:    Nguyen Borja  is 85 year old (1/11/1934), who is being seen today for a federally mandated E/M visit.  HPI information obtained from: facility chart records, facility staff, and [facility] chart review.     Today's concerns are:  - - Resident seen and examined.    Reportedly continued to be at baseline, comfortable, sleep, appetite and BM are at baseline, pain manageable. .   - RN has no concern today.   - GNP has no concern  --------------------------------  - - Past Medical, social, family histories, medications, and allergies reviewed and updated  - Medications reviewed: in the chart and EHR.   - Case Management:   I have reviewed the care plan and MDS and do agree with the plan. Patient's desire to return to the community is not present.  Information reviewed:  Medications, vital signs, orders, and nursing notes.  ================================================================  MEDICATIONS:  Current Outpatient Medications   Medication Sig Dispense Refill     atropine 1 % SOLN Place 4 drops under the tongue every 2 hours as needed for secretions       HYDROcodone-acetaminophen (NORCO) 5-325 MG per tablet Take 1 tablet by mouth 3 times daily       levothyroxine (SYNTHROID) 75 MCG tablet Take 75 mcg by mouth every morning       loperamide (IMODIUM) 2 MG capsule Take 2 mg by mouth as needed for diarrhea       morphine sulfate 20 MG/5ML SOLN Take 5 mg by mouth every 2 hours as needed       nystatin (MYCOSTATIN) 349674 UNIT/GM POWD Apply topically daily as needed       ACETAMINOPHEN PO Take 650 mg by mouth every 4 hours as needed for pain    "    chlorhexidine (PERIDEX) 0.12 % solution Take by mouth 2 times daily One oral strip by mouth two times a day for gingivitis.  Swab mouth/gums BID after oral cares.       miconazole (MICATIN) 2 % AERP powder Apply topically as needed       senna-docusate (SENOKOT-S;PERICOLACE) 8.6-50 MG per tablet Take 1 tablet by mouth 2 times daily as needed for constipation       ROS: Unobtainable secondary to cognitive impairment.     Vitals:  /66   Pulse 75   Temp 97.5  F (36.4  C)   Resp 16   Ht 1.651 m (5' 5\")   Wt 79.2 kg (174 lb 9.6 oz)   SpO2 94%   BMI 29.05 kg/m     Body mass index is 29.05 kg/m .  Exam:   GENERAL APPEARANCE:  awake no acute distress  RESP:  lungs clear to auscultation , no wheezing  CV:  S1S2 audible,  regular rate and rhythm, no murmur, rub, or gallop, no edema  ABDOMEN:  normal bowel sounds, soft, nontender, no palpable mass  SKIN:  Inspection of skin and subcutaneous tissue baseline, Palpation of skin and subcutaneous tissue baseline  MSK: no joint deformity noted on observation.   Neuro: no NFD appreciated   Psych: non verbal, pleasantly confused.     Lab/Diagnostic data: Recent labs in [facility] reviewed by me today.     ASSESSMENT/PLAN  Hypertension, benign essential: diet controlled.     Hyperlipidemia LDL goal <130: Not on meds.     Primary osteoarthritis involving multiple joints: analgesia optimal    Frailty: - Significant  Deficits requiring NH placement. Requiring extensive assistance from nursing. Up for meals only o/w spends the day resting in bed    Hypothyroidism due to acquired atrophy of thyroid: - on LT4, At baseline.      Late onset Alzheimer's disease without behavioral disturbance  Hospice Care  - Continue to anticipate needs. Chronic condition, ongoing decline expected.   -  Continue to provide redirection and reassurance as needed. Maintain safe living situation with goals focused on comfort.  - Symptoms managed by [facility] GNP and Hospice Team.     Orders:  - See above, " otherwise, continue the rest of the current POC.       Electronically signed by:  Jhony Shields MD            Sincerely,        Jhony Shields MD

## 2019-10-19 PROBLEM — E11.9 DIABETES MELLITUS, TYPE 2 (H): Status: ACTIVE | Noted: 2019-10-19

## 2019-11-18 ASSESSMENT — MIFFLIN-ST. JEOR: SCORE: 1219.72

## 2019-11-19 VITALS
WEIGHT: 170.6 LBS | TEMPERATURE: 98.4 F | RESPIRATION RATE: 16 BRPM | DIASTOLIC BLOOD PRESSURE: 105 MMHG | OXYGEN SATURATION: 94 % | HEART RATE: 85 BPM | HEIGHT: 65 IN | SYSTOLIC BLOOD PRESSURE: 189 MMHG | BODY MASS INDEX: 28.42 KG/M2

## 2019-11-19 NOTE — PROGRESS NOTES
[place] [facility]  [place] Medical Record Number:  [id number]  Place of Service where encounter took place:  [facility] ([facility]) [[id number]]  Chief Complaint   Patient presents with     RECHECK       HPI:    Nguyen Borja  is a 85 year old (1/11/1934), who is being seen today for an episodic care visit.  HPI information obtained from: facility chart records, facility staff, patient report and [facility] chart review.    Seeing patient today for follow-up.  Hospice nurse reports increased secretions and difficulty swallowing liquids.  This has improved with nectar thick liquids.  Staff RN reports no choking episodes or respiratory concerns today.  Patient has had no fevers or chills.  No mood or behavior changes noted.      Past Medical and Surgical History reviewed in Epic today.    MEDICATIONS:  Current Outpatient Medications   Medication Sig Dispense Refill     atropine 1 % SOLN Place 4 drops under the tongue every 2 hours as needed for secretions       HYDROcodone-acetaminophen (NORCO) 5-325 MG per tablet Take 2 tablets by mouth 3 times daily        levothyroxine (SYNTHROID) 75 MCG tablet Take 75 mcg by mouth every morning       levothyroxine (SYNTHROID/LEVOTHROID) 50 MCG tablet Take 50 mcg by mouth daily       loperamide (IMODIUM) 2 MG capsule Take 2 mg by mouth as needed for diarrhea       morphine sulfate 20 MG/5ML SOLN Take 5 mg by mouth every 2 hours as needed       nystatin (MYCOSTATIN) 055752 UNIT/GM POWD Apply topically daily as needed       ACETAMINOPHEN PO Take 650 mg by mouth every 4 hours as needed for pain       miconazole (MICATIN) 2 % AERP powder Apply topically as needed       senna-docusate (SENOKOT-S;PERICOLACE) 8.6-50 MG per tablet Take 1 tablet by mouth 2 times daily as needed for constipation         REVIEW OF SYSTEMS:  Unobtainable secondary to cognitive impairment.     Objective:  BP (!) 189/105   Pulse 85   Temp 98.4  F (36.9  C)   Resp 16    "Ht 1.651 m (5' 5\")   Wt 77.4 kg (170 lb 9.6 oz)   SpO2 94%   BMI 28.39 kg/m    Exam:  GENERAL APPEARANCE:  in no distress, uncooperative  RESP:  lungs clear to auscultation , no respiratory distress  CV:  regular rate and rhythm, no murmur, rub, or gallop, no edema  SKIN:  Inspection of skin and subcutaneous tissue baseline  PSYCH:  memory impaired , Nonverbal    Labs:   No labs, patient is on hospice   TSH   Date Value Ref Range Status   06/13/2019 1.56 0.40 - 4.00 mU/L Final       ASSESSMENT/PLAN:     Late onset Alzheimer's disease without behavioral disturbance (H)  Frailty  Hypothyroidism due to acquired atrophy of thyroid  Dysphagia, unspecified type  Hospice care patient   -Patient with ongoing decline.  No breathing concerns or secretions noted today.  Okay to continue PRN atropine if excessive secretions.  Okay to continue nectar thick liquids as patient is tolerating.  Expect ongoing decline due to advanced dementia  -Weight noted to be down 7 pounds, last TSH 1.56 in June.  TSH goal 5-6, will reduce Synthroid 75-50 mcgs.   -Staff or hospice to update NP with ongoing concerns    transcribed by : Zhane Ruiz  Orders:  1. Continue PRN Atropine if excessive excretions  2. Continue Nectar thick ;iquids  3. Decrease Synthroid to 50 mcg every day       Electronically signed by:  TONE Forrester CNP         "

## 2019-11-20 ENCOUNTER — NURSING HOME VISIT (OUTPATIENT)
Dept: GERIATRICS | Facility: CLINIC | Age: 84
End: 2019-11-20
Payer: COMMERCIAL

## 2019-11-20 DIAGNOSIS — E03.4 HYPOTHYROIDISM DUE TO ACQUIRED ATROPHY OF THYROID: ICD-10-CM

## 2019-11-20 DIAGNOSIS — R13.10 DYSPHAGIA, UNSPECIFIED TYPE: ICD-10-CM

## 2019-11-20 DIAGNOSIS — F02.80 LATE ONSET ALZHEIMER'S DISEASE WITHOUT BEHAVIORAL DISTURBANCE (H): Primary | ICD-10-CM

## 2019-11-20 DIAGNOSIS — G30.1 LATE ONSET ALZHEIMER'S DISEASE WITHOUT BEHAVIORAL DISTURBANCE (H): Primary | ICD-10-CM

## 2019-11-20 DIAGNOSIS — Z51.5 HOSPICE CARE PATIENT: ICD-10-CM

## 2019-11-20 DIAGNOSIS — R54 FRAILTY: ICD-10-CM

## 2019-11-20 PROCEDURE — 99309 SBSQ NF CARE MODERATE MDM 30: CPT | Mod: GW | Performed by: NURSE PRACTITIONER

## 2019-11-20 RX ORDER — LEVOTHYROXINE SODIUM 50 UG/1
50 TABLET ORAL DAILY
COMMUNITY

## 2019-11-20 NOTE — LETTER
11/20/2019        RE: Nguyen Borja  [facility]  [address]  [facility] [address]        [place] [facility]  [place] Medical Record Number:  [id number]  Place of [facility] where encounter took place:  [facility] ([facility]) [[id number]]  Chief Complaint   Patient presents with     RECHECK       HPI:    Nguyen Borja  is a 85 year old (1/11/1934), who is being seen today for an episodic care visit.  HPI information obtained from: facility chart records, facility staff, patient report and [facility] chart review.    Seeing patient today for follow-up.  Hospice nurse reports increased secretions and difficulty swallowing liquids.  This has improved with nectar thick liquids.  Staff RN reports no choking episodes or respiratory concerns today.  Patient has had no fevers or chills.  No mood or behavior changes noted.      Past Medical and Surgical History reviewed in Epic today.    MEDICATIONS:  Current Outpatient Medications   Medication Sig Dispense Refill     atropine 1 % SOLN Place 4 drops under the tongue every 2 hours as needed for secretions       HYDROcodone-acetaminophen (NORCO) 5-325 MG per tablet Take 2 tablets by mouth 3 times daily        levothyroxine (SYNTHROID) 75 MCG tablet Take 75 mcg by mouth every morning       levothyroxine (SYNTHROID/LEVOTHROID) 50 MCG tablet Take 50 mcg by mouth daily       loperamide (IMODIUM) 2 MG capsule Take 2 mg by mouth as needed for diarrhea       morphine sulfate 20 MG/5ML SOLN Take 5 mg by mouth every 2 hours as needed       nystatin (MYCOSTATIN) 831560 UNIT/GM POWD Apply topically daily as needed       ACETAMINOPHEN PO Take 650 mg by mouth every 4 hours as needed for pain       miconazole (MICATIN) 2 % AERP powder Apply topically as needed       senna-docusate (SENOKOT-S;PERICOLACE) 8.6-50 MG per tablet Take 1 tablet by mouth 2 times daily as needed for constipation         REVIEW OF SYSTEMS:  Unobtainable  "secondary to cognitive impairment.     Objective:  BP (!) 189/105   Pulse 85   Temp 98.4  F (36.9  C)   Resp 16   Ht 1.651 m (5' 5\")   Wt 77.4 kg (170 lb 9.6 oz)   SpO2 94%   BMI 28.39 kg/m     Exam:  GENERAL APPEARANCE:  in no distress, uncooperative  RESP:  lungs clear to auscultation , no respiratory distress  CV:  regular rate and rhythm, no murmur, rub, or gallop, no edema  SKIN:  Inspection of skin and subcutaneous tissue baseline  PSYCH:  memory impaired , Nonverbal    Labs:   No labs, patient is on hospice   TSH   Date Value Ref Range Status   06/13/2019 1.56 0.40 - 4.00 mU/L Final       ASSESSMENT/PLAN:     Late onset Alzheimer's disease without behavioral disturbance (H)  Frailty  Hypothyroidism due to acquired atrophy of thyroid  Dysphagia, unspecified type  Hospice care patient   -Patient with ongoing decline.  No breathing concerns or secretions noted today.  Okay to continue PRN atropine if excessive secretions.  Okay to continue nectar thick liquids as patient is tolerating.  Expect ongoing decline due to advanced dementia  -Weight noted to be down 7 pounds, last TSH 1.56 in June.  TSH goal 5-6, will reduce Synthroid 75-50 mcgs.   -Staff or hospice to update NP with ongoing concerns    transcribed by : Zhane Ruiz  Orders:  1. Continue PRN Atropine if excessive excretions  2. Continue Nectar thick ;iquids  3. Decrease Synthroid to 50 mcg every day       Electronically signed by:  TONE Forrester CNP             Sincerely,        TONE Forerster CNP    "

## 2019-12-10 NOTE — PROGRESS NOTES
"[place] [facility]  Chief Complaint   Patient presents with     alf Regulatory     [place] Medical Record Number:  [id number]  Place of Service where encounter took place[facility] ([facility] [[id number]]      HPI:    Nguyen Borja  is 85 year old (1/11/1934), who is being seen today for a federally mandated E/M visit.  HPI information obtained from: facility chart records, facility staff, and [facility] chart review.     Today's concerns are:  - - Resident seen and examined. Reportedly continued to be at baseline, comfortable, sleep, appetite and BM are at baseline, pain manageable. .   --------------------------------  - - Past Medical, social, family histories, medications, and allergies reviewed and updated  - Medications reviewed: in the chart and EHR.   - Case Management:   I have reviewed the care plan and MDS and do agree with the plan. Patient's desire to return to the community is not present.  Information reviewed:  Medications, vital signs, orders, and nursing notes.  ================================================================  MEDICATIONS:  Current Outpatient Medications   Medication Sig Dispense Refill     ACETAMINOPHEN PO Take 650 mg by mouth every 4 hours as needed for pain       atropine 1 % SOLN Place 4 drops under the tongue every 2 hours as needed for secretions       HYDROcodone-acetaminophen (NORCO) 5-325 MG per tablet Take 2 tablets by mouth 3 times daily        levothyroxine (SYNTHROID/LEVOTHROID) 50 MCG tablet Take 50 mcg by mouth daily       loperamide (IMODIUM) 2 MG capsule Take 2 mg by mouth as needed for diarrhea       morphine sulfate 20 MG/5ML SOLN Take 5 mg by mouth every 2 hours as needed       nystatin (MYCOSTATIN) 377212 UNIT/GM POWD Apply topically daily as needed       ROS: Unobtainable secondary to cognitive impairment.     Vitals:  /55   Pulse 66   Temp 97.5  F (36.4  C)   Resp 16   Ht 1.651 m (5' 5\")   Wt 77.1 " kg (170 lb)   SpO2 99%   BMI 28.29 kg/m    Body mass index is 28.29 kg/m .  Exam:   GENERAL APPEARANCE:  awake no acute distress  RESP:  lungs clear to auscultation , no wheezing  CV:  S1S2 audible,  regular rate and rhythm, no murmur, rub, or gallop, no edema  ABDOMEN:  normal bowel sounds, soft, nontender, no palpable mass  SKIN:  thin and frail skin  MSK: no joint deformity noted on observation.   Neuro: no NFD appreciated   Psych: non verbal, pleasantly confused.     Lab/Diagnostic data: Reviewed in the chart and EHR.        ASSESSMENT/PLAN  Hypertension, benign essential: diet controlled.     Hyperlipidemia: Not on meds.     Primary osteoarthritis involving multiple joints: analgesia optimal    Frailty: - Significant  Deficits requiring NH placement. Requiring extensive assistance from nursing. Up for meals only o/w spends the day resting in bed    Hypothyroidism due to acquired atrophy of thyroid: - on LT4, At baseline.      Late onset Alzheimer's disease without behavioral disturbance (H)  Hospice Care  - Continue to anticipate needs. Chronic condition, ongoing decline expected.   -  Continue to provide redirection and reassurance as needed. Maintain safe living situation with goals focused on comfort.  -Appreciate collaboration with  hospice team for symptom management in collaboration with cares for maximum comfort at end-of-life       Orders: - See above, otherwise, continue the rest of the current POC.       Electronically signed by:  Jhony Shields MD

## 2019-12-11 NOTE — LETTER
12/11/2019        RE: Nguyen Borja  [facility]  [address]  [facility] [address]        [place] [facility]  Chief Complaint   Patient presents with     care home Regulatory     [place] Medical Record Number:  [id number]  Place of Service where encounter took place[facility] [[id number]]      HPI:    Nguyen Borja  is 85 year old (1/11/1934), who is being seen today for a federally mandated E/M visit.  HPI information obtained from: facility chart records, facility staff, and [facility] chart review.     Today's concerns are:  - - Resident seen and examined. Reportedly continued to be at baseline, comfortable, sleep, appetite and BM are at baseline, pain manageable. .   --------------------------------  - - Past Medical, social, family histories, medications, and allergies reviewed and updated  - Medications reviewed: in the chart and EHR.   - Case Management:   I have reviewed the care plan and MDS and do agree with the plan. Patient's desire to return to the community is not present.  Information reviewed:  Medications, vital signs, orders, and nursing notes.  ================================================================  MEDICATIONS:  Current Outpatient Medications   Medication Sig Dispense Refill     ACETAMINOPHEN PO Take 650 mg by mouth every 4 hours as needed for pain       atropine 1 % SOLN Place 4 drops under the tongue every 2 hours as needed for secretions       HYDROcodone-acetaminophen (NORCO) 5-325 MG per tablet Take 2 tablets by mouth 3 times daily        levothyroxine (SYNTHROID/LEVOTHROID) 50 MCG tablet Take 50 mcg by mouth daily       loperamide (IMODIUM) 2 MG capsule Take 2 mg by mouth as needed for diarrhea       morphine sulfate 20 MG/5ML SOLN Take 5 mg by mouth every 2 hours as needed       nystatin (MYCOSTATIN) 128157 UNIT/GM POWD Apply topically daily as needed       ROS: Unobtainable secondary to cognitive  "impairment.     Vitals:  /55   Pulse 66   Temp 97.5  F (36.4  C)   Resp 16   Ht 1.651 m (5' 5\")   Wt 77.1 kg (170 lb)   SpO2 99%   BMI 28.29 kg/m     Body mass index is 28.29 kg/m .  Exam:   GENERAL APPEARANCE:  awake no acute distress  RESP:  lungs clear to auscultation , no wheezing  CV:  S1S2 audible,  regular rate and rhythm, no murmur, rub, or gallop, no edema  ABDOMEN:  normal bowel sounds, soft, nontender, no palpable mass  SKIN:  thin and frail skin  MSK: no joint deformity noted on observation.   Neuro: no NFD appreciated   Psych: non verbal, pleasantly confused.     Lab/Diagnostic data: Reviewed in the chart and EHR.        ASSESSMENT/PLAN  Hypertension, benign essential: diet controlled.     Hyperlipidemia: Not on meds.     Primary osteoarthritis involving multiple joints: analgesia optimal    Frailty: - Significant  Deficits requiring NH placement. Requiring extensive assistance from nursing. Up for meals only o/w spends the day resting in bed    Hypothyroidism due to acquired atrophy of thyroid: - on LT4, At baseline.      Late onset Alzheimer's disease without behavioral disturbance (H)  Hospice Care  - Continue to anticipate needs. Chronic condition, ongoing decline expected.   -  Continue to provide redirection and reassurance as needed. Maintain safe living situation with goals focused on comfort.  -Appreciate collaboration with  hospice team for symptom management in collaboration with cares for maximum comfort at end-of-life       Orders: - See above, otherwise, continue the rest of the current POC.       Electronically signed by:  Jhony Shields MD            Sincerely,        Jhony Shields MD    "

## 2020-01-01 ENCOUNTER — HOSPITAL LABORATORY (OUTPATIENT)
Facility: OTHER | Age: 85
End: 2020-01-01

## 2020-01-01 ENCOUNTER — VIRTUAL VISIT (OUTPATIENT)
Dept: GERIATRICS | Facility: CLINIC | Age: 85
End: 2020-01-01
Payer: COMMERCIAL

## 2020-01-01 ENCOUNTER — NURSING HOME VISIT (OUTPATIENT)
Dept: GERIATRICS | Facility: CLINIC | Age: 85
End: 2020-01-01
Payer: COMMERCIAL

## 2020-01-01 ENCOUNTER — DOCUMENTATION ONLY (OUTPATIENT)
Dept: OTHER | Facility: CLINIC | Age: 85
End: 2020-01-01

## 2020-01-01 VITALS
WEIGHT: 170 LBS | TEMPERATURE: 97.3 F | RESPIRATION RATE: 16 BRPM | SYSTOLIC BLOOD PRESSURE: 130 MMHG | DIASTOLIC BLOOD PRESSURE: 88 MMHG | OXYGEN SATURATION: 95 % | BODY MASS INDEX: 28.29 KG/M2 | HEART RATE: 74 BPM

## 2020-01-01 VITALS
TEMPERATURE: 98 F | SYSTOLIC BLOOD PRESSURE: 126 MMHG | OXYGEN SATURATION: 98 % | DIASTOLIC BLOOD PRESSURE: 81 MMHG | BODY MASS INDEX: 28.09 KG/M2 | HEART RATE: 68 BPM | HEIGHT: 65 IN | WEIGHT: 168.6 LBS | RESPIRATION RATE: 18 BRPM

## 2020-01-01 VITALS
DIASTOLIC BLOOD PRESSURE: 86 MMHG | BODY MASS INDEX: 28.69 KG/M2 | SYSTOLIC BLOOD PRESSURE: 112 MMHG | HEART RATE: 65 BPM | TEMPERATURE: 97.8 F | OXYGEN SATURATION: 95 % | RESPIRATION RATE: 18 BRPM | WEIGHT: 172.4 LBS

## 2020-01-01 VITALS
BODY MASS INDEX: 28.72 KG/M2 | TEMPERATURE: 98.4 F | RESPIRATION RATE: 16 BRPM | SYSTOLIC BLOOD PRESSURE: 138 MMHG | WEIGHT: 172.4 LBS | HEIGHT: 65 IN | HEART RATE: 63 BPM | OXYGEN SATURATION: 96 % | DIASTOLIC BLOOD PRESSURE: 79 MMHG

## 2020-01-01 VITALS
DIASTOLIC BLOOD PRESSURE: 82 MMHG | SYSTOLIC BLOOD PRESSURE: 128 MMHG | WEIGHT: 171.7 LBS | TEMPERATURE: 97.9 F | HEART RATE: 76 BPM | BODY MASS INDEX: 28.57 KG/M2 | OXYGEN SATURATION: 95 % | RESPIRATION RATE: 16 BRPM

## 2020-01-01 VITALS
DIASTOLIC BLOOD PRESSURE: 100 MMHG | TEMPERATURE: 98.1 F | HEIGHT: 65 IN | WEIGHT: 173.4 LBS | RESPIRATION RATE: 18 BRPM | SYSTOLIC BLOOD PRESSURE: 127 MMHG | HEART RATE: 60 BPM | BODY MASS INDEX: 28.89 KG/M2 | OXYGEN SATURATION: 94 %

## 2020-01-01 DIAGNOSIS — R54 FRAILTY: ICD-10-CM

## 2020-01-01 DIAGNOSIS — N18.30 CKD (CHRONIC KIDNEY DISEASE) STAGE 3, GFR 30-59 ML/MIN (H): ICD-10-CM

## 2020-01-01 DIAGNOSIS — Z51.5 HOSPICE CARE PATIENT: ICD-10-CM

## 2020-01-01 DIAGNOSIS — I10 ESSENTIAL HYPERTENSION: ICD-10-CM

## 2020-01-01 DIAGNOSIS — M15.0 PRIMARY OSTEOARTHRITIS INVOLVING MULTIPLE JOINTS: ICD-10-CM

## 2020-01-01 DIAGNOSIS — G30.1 LATE ONSET ALZHEIMER'S DISEASE WITHOUT BEHAVIORAL DISTURBANCE (H): ICD-10-CM

## 2020-01-01 DIAGNOSIS — F02.80 LATE ONSET ALZHEIMER'S DISEASE WITHOUT BEHAVIORAL DISTURBANCE (H): Primary | ICD-10-CM

## 2020-01-01 DIAGNOSIS — K59.01 SLOW TRANSIT CONSTIPATION: ICD-10-CM

## 2020-01-01 DIAGNOSIS — F02.80 LATE ONSET ALZHEIMER'S DISEASE WITHOUT BEHAVIORAL DISTURBANCE (H): ICD-10-CM

## 2020-01-01 DIAGNOSIS — I10 HYPERTENSION, BENIGN ESSENTIAL, GOAL BELOW 140/90: ICD-10-CM

## 2020-01-01 DIAGNOSIS — E03.4 HYPOTHYROIDISM DUE TO ACQUIRED ATROPHY OF THYROID: ICD-10-CM

## 2020-01-01 DIAGNOSIS — G30.1 LATE ONSET ALZHEIMER'S DISEASE WITHOUT BEHAVIORAL DISTURBANCE (H): Primary | ICD-10-CM

## 2020-01-01 DIAGNOSIS — M17.0 PRIMARY OSTEOARTHRITIS OF BOTH KNEES: ICD-10-CM

## 2020-01-01 DIAGNOSIS — I10 HYPERTENSION, BENIGN ESSENTIAL, GOAL BELOW 140/90: Primary | ICD-10-CM

## 2020-01-01 DIAGNOSIS — N18.31 STAGE 3A CHRONIC KIDNEY DISEASE (H): ICD-10-CM

## 2020-01-01 DIAGNOSIS — E11.9 TYPE 2 DIABETES MELLITUS WITHOUT COMPLICATION, WITHOUT LONG-TERM CURRENT USE OF INSULIN (H): ICD-10-CM

## 2020-01-01 DIAGNOSIS — E78.2 MIXED HYPERLIPIDEMIA: ICD-10-CM

## 2020-01-01 DIAGNOSIS — R54 FRAIL ELDERLY: ICD-10-CM

## 2020-01-01 LAB — TSH SERPL DL<=0.005 MIU/L-ACNC: 5.3 MU/L (ref 0.4–4)

## 2020-01-01 PROCEDURE — 99309 SBSQ NF CARE MODERATE MDM 30: CPT | Mod: GT | Performed by: FAMILY MEDICINE

## 2020-01-01 PROCEDURE — 99309 SBSQ NF CARE MODERATE MDM 30: CPT | Mod: GV | Performed by: NURSE PRACTITIONER

## 2020-01-01 PROCEDURE — 99309 SBSQ NF CARE MODERATE MDM 30: CPT | Mod: GW | Performed by: NURSE PRACTITIONER

## 2020-01-01 PROCEDURE — 99308 SBSQ NF CARE LOW MDM 20: CPT | Mod: 95 | Performed by: FAMILY MEDICINE

## 2020-01-01 PROCEDURE — 99207 ZZC CDG-CUT & PASTE-POTENTIAL IMPACT ON LEVEL: CPT | Performed by: NURSE PRACTITIONER

## 2020-01-01 PROCEDURE — 99309 SBSQ NF CARE MODERATE MDM 30: CPT | Mod: 95 | Performed by: NURSE PRACTITIONER

## 2020-01-01 PROCEDURE — 99207 ZZC CDG-MDM COMPONENT: MEETS LOW - DOWN CODED: CPT | Performed by: FAMILY MEDICINE

## 2020-01-01 RX ORDER — AMOXICILLIN 250 MG
1 CAPSULE ORAL 2 TIMES DAILY
COMMUNITY

## 2020-01-01 RX ORDER — LORAZEPAM 0.5 MG/1
0.5 TABLET ORAL 3 TIMES DAILY
COMMUNITY
Start: 2020-01-01

## 2020-01-01 ASSESSMENT — MIFFLIN-ST. JEOR
SCORE: 1227.42
SCORE: 1222.88
SCORE: 1205.64
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
## 2020-04-13 NOTE — PROGRESS NOTES
"[facility] Regulatory   Nguyen Borja is being evaluated via a billable video visit due to the restrictions of the Covid-19 pandemic.   The patient has been notified of following:  \"This video visit will be conducted via a call between you and your provider. We have found that certain health care needs can be provided without the need for an in-person physical exam.  This service lets us provide the care you need with a video conversation. If during the course of the call the provider feels a video visit is not appropriate, you will not be charged for this service.\"   The provider has received verbal consent for a Video Visit from the patient or first contact? Yes  Patient or facility staff would like the video invitation sent by: N/A   Video Start Time: 14:00  [place] Medical Record Number:  [id number]  Place of Location at the time of visit: [facility]  Chief Complaint   Patient presents with     senior care Regulatory     HPI:    Nguyen Borja  is 85 year old (1/11/1934), who is being seen today for a federally mandated E/M visit.  HPI information obtained from: facility chart records, facility staff, and [place] Epic chart review.     Today's concerns are:  -  RN reports Resident  continued to be at baseline, comfortable, sleep, appetite and BM are at baseline, pain manageable. .   -------------------------------------------------------------------------------------------------------------------------  - Past Medical, social, family histories, medications, and allergies reviewed and updated  - Medications reviewed: in the chart and EHR.   - Case Management:   I have reviewed the care plan and MDS and do agree with the plan. Patient's desire to return to the community is not present.  Information reviewed:  Medications, vital signs, orders, and nursing notes.  ================================================================  MEDICATIONS:  Current Outpatient Medications   Medication Sig " Dispense Refill     ACETAMINOPHEN PO Take 650 mg by mouth every 4 hours as needed for pain       atropine 1 % SOLN Place 4 drops under the tongue every 2 hours as needed for secretions       HYDROcodone-acetaminophen (NORCO) 5-325 MG per tablet Take 2 tablets by mouth 3 times daily        levothyroxine (SYNTHROID/LEVOTHROID) 50 MCG tablet Take 50 mcg by mouth daily       loperamide (IMODIUM) 2 MG capsule Take 2 mg by mouth as needed for diarrhea       magnesium hydroxide (MILK OF MAGNESIA) 400 MG/5ML suspension Take 30 mLs by mouth daily as needed for constipation or heartburn       morphine sulfate 20 MG/5ML SOLN Take 5 mg by mouth every 2 hours as needed       nystatin (MYCOSTATIN) 392890 UNIT/GM POWD Apply topically daily as needed       ROS: Unobtainable secondary to cognitive impairment.     Vitals:  /88   Pulse 74   Temp 97.3  F (36.3  C)   Resp 16   Wt 77.1 kg (170 lb)   SpO2 95%   BMI 28.29 kg/m    Body mass index is 28.29 kg/m .  Exam:   GENERAL APPEARANCE:  awake no acute distress  RESP:  lungs clear to auscultation , no wheezing  SKIN:  thin and frail skin  MSK: no joint deformity noted on observation.   Neuro: no NFD noted  Psych: non verbal, pleasantly confused.     Lab/Diagnostic data: Reviewed in the chart and EHR.        ASSESSMENT/PLAN  Hypertension, benign essential: diet controlled.   Hyperlipidemia: Not on meds.   Primary osteoarthritis involving multiple joints: analgesia optimal  Frailty: - Significant  Deficits requiring NH placement. Requiring extensive assistance from nursing. Up for meals only o/w spends the day resting in bed  Hypothyroidism due to acquired atrophy of thyroid: - on LT4, At baseline.      Late onset Alzheimer's disease without behavioral disturbance (H)  Hospice Care  - Continue to anticipate needs. Chronic condition, ongoing decline expected.   -  Continue to provide redirection and reassurance as needed. Maintain safe living situation with goals focused on  comfort.  -Appreciate collaboration with  hospice team for symptom management in collaboration with cares for maximum comfort at end-of-life       Orders: - See above, otherwise, continue the rest of the current POC.       Electronically signed by:  Jhony Shields MD    Video-Visit Details  Type of service:  Video Visit  Video End Time (time video stopped): 14:02  Distant Location (provider location):  [facility]

## 2020-04-14 NOTE — LETTER
"    4/14/2020        RE: Nguyen Borja  [address]  [facility] [address]        [place] [facility] Regulatory   Nguyen Borja is being evaluated via a billable video visit due to the restrictions of the Covid-19 pandemic.   The patient has been notified of following:  \"This video visit will be conducted via a call between you and your provider. We have found that certain health care needs can be provided without the need for an in-person physical exam.  This service lets us provide the care you need with a video conversation. If during the course of the call the provider feels a video visit is not appropriate, you will not be charged for this service.\"   The provider has received verbal consent for a Video Visit from the patient or first contact? Yes  Patient or facility staff would like the video invitation sent by: N/A   Video Start Time: 14:00  [place] Medical Record Number:  [id number]  Place of Location at the time of visit: [facility]  Chief Complaint   Patient presents with     longterm Regulatory     HPI:    Nguyen Borja  is 85 year old (1/11/1934), who is being seen today for a federally mandated E/M visit.  HPI information obtained from: facility chart records, facility staff, and [place] Epic chart review.     Today's concerns are:  -  RN reports Resident  continued to be at baseline, comfortable, sleep, appetite and BM are at baseline, pain manageable. .   -------------------------------------------------------------------------------------------------------------------------  - Past Medical, social, family histories, medications, and allergies reviewed and updated  - Medications reviewed: in the chart and EHR.   - Case Management:   I have reviewed the care plan and MDS and do agree with the plan. Patient's desire to return to the community is not present.  Information reviewed:  Medications, vital signs, orders, and nursing " notes.  ================================================================  MEDICATIONS:  Current Outpatient Medications   Medication Sig Dispense Refill     ACETAMINOPHEN PO Take 650 mg by mouth every 4 hours as needed for pain       atropine 1 % SOLN Place 4 drops under the tongue every 2 hours as needed for secretions       HYDROcodone-acetaminophen (NORCO) 5-325 MG per tablet Take 2 tablets by mouth 3 times daily        levothyroxine (SYNTHROID/LEVOTHROID) 50 MCG tablet Take 50 mcg by mouth daily       loperamide (IMODIUM) 2 MG capsule Take 2 mg by mouth as needed for diarrhea       magnesium hydroxide (MILK OF MAGNESIA) 400 MG/5ML suspension Take 30 mLs by mouth daily as needed for constipation or heartburn       morphine sulfate 20 MG/5ML SOLN Take 5 mg by mouth every 2 hours as needed       nystatin (MYCOSTATIN) 880985 UNIT/GM POWD Apply topically daily as needed       ROS: Unobtainable secondary to cognitive impairment.     Vitals:  /88   Pulse 74   Temp 97.3  F (36.3  C)   Resp 16   Wt 77.1 kg (170 lb)   SpO2 95%   BMI 28.29 kg/m    Body mass index is 28.29 kg/m .  Exam:   GENERAL APPEARANCE:  awake no acute distress  RESP:  lungs clear to auscultation , no wheezing  SKIN:  thin and frail skin  MSK: no joint deformity noted on observation.   Neuro: no NFD noted  Psych: non verbal, pleasantly confused.     Lab/Diagnostic data: Reviewed in the chart and EHR.        ASSESSMENT/PLAN  Hypertension, benign essential: diet controlled.   Hyperlipidemia: Not on meds.   Primary osteoarthritis involving multiple joints: analgesia optimal  Frailty: - Significant  Deficits requiring NH placement. Requiring extensive assistance from nursing. Up for meals only o/w spends the day resting in bed  Hypothyroidism due to acquired atrophy of thyroid: - on LT4, At baseline.      Late onset Alzheimer's disease without behavioral disturbance (H)  Hospice Care  - Continue to anticipate needs. Chronic condition, ongoing  decline expected.   -  Continue to provide redirection and reassurance as needed. Maintain safe living situation with goals focused on comfort.  -Appreciate collaboration with  hospice team for symptom management in collaboration with cares for maximum comfort at end-of-life       Orders: - See above, otherwise, continue the rest of the current POC.       Electronically signed by:  Jhony Shields MD    Video-Visit Details  Type of service:  Video Visit  Video End Time (time video stopped): 14:02  Distant Location (provider location):  [place] [facility]           Sincerely,        Jhony Shields MD
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
## 2020-08-10 NOTE — PROGRESS NOTES
"[facility] Regulatory   Nguyen Borja is being evaluated via a billable video visit due to the restrictions of the Covid-19 pandemic.   The patient has been notified of following:  \"This video visit will be conducted via a call between you and your provider. We have found that certain health care needs can be provided without the need for an in-person physical exam.  This service lets us provide the care you need with a video conversation. If during the course of the call the provider feels a video visit is not appropriate, you will not be charged for this service.\"   The provider has received verbal consent for a Video Visit from the patient or first contact? Yes  Patient or facility staff would like the video invitation sent by: N/A   Video Start Time: 10:28  [place] Medical Record Number:  [id number]  Place of Location at the time of visit: [facility]  Chief Complaint   Patient presents with     halfway Regulatory     Video Visit     HPI:    Nguyen Borja  is 85 year old (1/11/1934), who is being seen today for a federally mandated E/M visit.  HPI information obtained from: facility chart records, facility staff, and [place] [facility] chart review.     Today's concerns are:  - Pt seen in the presence of RN who graciously assisted with the virtual visit  -  RN reports Resident  continued to be at baseline, comfortable, sleep, appetite and BM are at baseline, pain manageable. Now has new mattress.   -------------------------------------  - Past Medical, social, family histories, medications, and allergies reviewed and updated  - Medications reviewed: in the chart and EHR.   - Case Management:   I have reviewed the care plan and MDS and do agree with the plan. Patient's desire to return to the community is not present.  Information reviewed:  Medications, vital signs, orders, and nursing notes.  ================================================================  MEDICATIONS:  Current Outpatient " "Medications   Medication Sig Dispense Refill     atropine 1 % SOLN Place 4 drops under the tongue every 2 hours as needed for secretions       HYDROcodone-acetaminophen (NORCO) 5-325 MG per tablet Take 2 tablets by mouth 3 times daily        levothyroxine (SYNTHROID/LEVOTHROID) 50 MCG tablet Take 50 mcg by mouth daily       loperamide (IMODIUM) 2 MG capsule Take 2 mg by mouth as needed for diarrhea       LORazepam (ATIVAN) 0.5 MG tablet Take 0.5 mg by mouth 2 times daily       magnesium hydroxide (MILK OF MAGNESIA) 400 MG/5ML suspension Take 30 mLs by mouth daily as needed for constipation or heartburn       morphine sulfate 20 MG/5ML SOLN Take 5 mg by mouth every 2 hours as needed       nystatin (MYCOSTATIN) 205070 UNIT/GM POWD Apply topically daily as needed       ROS: Unobtainable secondary to cognitive impairment.     Vitals:  BP (!) 127/100   Pulse 60   Temp 98.1  F (36.7  C)   Resp 18   Ht 1.651 m (5' 5\")   Wt 78.7 kg (173 lb 6.4 oz)   SpO2 94%   BMI 28.86 kg/m    Body mass index is 28.86 kg/m .  Exam:   GENERAL APPEARANCE:  awake no acute distress  RESP:  lungs clear to auscultation , no wheezing  SKIN:  thin and frail skin  MSK: no joint deformity noted on observation.   Neuro: no NFD noted  Psych: non verbal, pleasantly confused.     Lab/Diagnostic data: Reviewed in the chart and EHR.        ASSESSMENT/PLAN  Hypertension, benign essential: diet controlled.     Hyperlipidemia: Not on meds.     Primary osteoarthritis involving multiple joints: analgesia optimal    Frailty: - Significant  Deficits requiring NH placement. Requiring extensive assistance from nursing. Up for meals only o/w spends the day resting in bed    Hypothyroidism due to acquired atrophy of thyroid: -  TSH   Date Value Ref Range Status   02/06/2020 5.30 (H) 0.40 - 4.00 mU/L Final   -  On Levothyroxine. In frail Elderly adult, recommended TSH level is around 6 providing patient is asymptomatic and FT4 is wnl- preferably on the lower " normal level.       Late onset Alzheimer's disease without behavioral disturbance (H)  Hospice Care  - Continue to anticipate needs. Chronic condition, ongoing decline expected.   -  Continue to provide redirection and reassurance as needed. Maintain safe living situation with goals focused on comfort.  -Appreciate collaboration with  hospice team for symptom management in collaboration with cares for maximum comfort at end-of-life       Orders: - See above, otherwise, continue the rest of the current POC.       Electronically signed by:  Jhony Shields MD    Video-Visit Details  Type of service:  Video Visit  Video End Time (time video stopped): 10:29  Distant Location (provider location):  [facility]

## 2020-08-12 NOTE — LETTER
"    8/12/2020        RE: Ngueyn Borja  [address] [address]        [place] [facility] Regulatory   Nguyen Borja is being evaluated via a billable video visit due to the restrictions of the Covid-19 pandemic.   The patient has been notified of following:  \"This video visit will be conducted via a call between you and your provider. We have found that certain health care needs can be provided without the need for an in-person physical exam.  This service lets us provide the care you need with a video conversation. If during the course of the call the provider feels a video visit is not appropriate, you will not be charged for this service.\"   The provider has received verbal consent for a Video Visit from the patient or first contact? Yes  Patient or facility staff would like the video invitation sent by: N/A   Video Start Time: 10:28  [place] Medical Record Number:  [id number]  Place of Location at the time of visit: [facility]  Chief Complaint   Patient presents with     FDC Regulatory     Video Visit     HPI:    Nguyen Borja  is 85 year old (1/11/1934), who is being seen today for a federally mandated E/M visit.  HPI information obtained from: facility chart records, facility staff, and [facility] chart review.     Today's concerns are:  - Pt seen in the presence of RN who graciously assisted with the virtual visit  -  RN reports Resident  continued to be at baseline, comfortable, sleep, appetite and BM are at baseline, pain manageable. Now has new mattress.   -------------------------------------  - Past Medical, social, family histories, medications, and allergies reviewed and updated  - Medications reviewed: in the chart and EHR.   - Case Management:   I have reviewed the care plan and MDS and do agree with the plan. Patient's desire to return to the community is not present.  Information reviewed:  Medications, vital signs, orders, " "and nursing notes.  ================================================================  MEDICATIONS:  Current Outpatient Medications   Medication Sig Dispense Refill     atropine 1 % SOLN Place 4 drops under the tongue every 2 hours as needed for secretions       HYDROcodone-acetaminophen (NORCO) 5-325 MG per tablet Take 2 tablets by mouth 3 times daily        levothyroxine (SYNTHROID/LEVOTHROID) 50 MCG tablet Take 50 mcg by mouth daily       loperamide (IMODIUM) 2 MG capsule Take 2 mg by mouth as needed for diarrhea       LORazepam (ATIVAN) 0.5 MG tablet Take 0.5 mg by mouth 2 times daily       magnesium hydroxide (MILK OF MAGNESIA) 400 MG/5ML suspension Take 30 mLs by mouth daily as needed for constipation or heartburn       morphine sulfate 20 MG/5ML SOLN Take 5 mg by mouth every 2 hours as needed       nystatin (MYCOSTATIN) 070109 UNIT/GM POWD Apply topically daily as needed       ROS: Unobtainable secondary to cognitive impairment.     Vitals:  BP (!) 127/100   Pulse 60   Temp 98.1  F (36.7  C)   Resp 18   Ht 1.651 m (5' 5\")   Wt 78.7 kg (173 lb 6.4 oz)   SpO2 94%   BMI 28.86 kg/m    Body mass index is 28.86 kg/m .  Exam:   GENERAL APPEARANCE:  awake no acute distress  RESP:  lungs clear to auscultation , no wheezing  SKIN:  thin and frail skin  MSK: no joint deformity noted on observation.   Neuro: no NFD noted  Psych: non verbal, pleasantly confused.     Lab/Diagnostic data: Reviewed in the chart and EHR.        ASSESSMENT/PLAN  Hypertension, benign essential: diet controlled.     Hyperlipidemia: Not on meds.     Primary osteoarthritis involving multiple joints: analgesia optimal    Frailty: - Significant  Deficits requiring NH placement. Requiring extensive assistance from nursing. Up for meals only o/w spends the day resting in bed    Hypothyroidism due to acquired atrophy of thyroid: -  TSH   Date Value Ref Range Status   02/06/2020 5.30 (H) 0.40 - 4.00 mU/L Final   -  On Levothyroxine. In frail " Elderly adult, recommended TSH level is around 6 providing patient is asymptomatic and FT4 is wnl- preferably on the lower normal level.       Late onset Alzheimer's disease without behavioral disturbance (H)  Hospice Care  - Continue to anticipate needs. Chronic condition, ongoing decline expected.   -  Continue to provide redirection and reassurance as needed. Maintain safe living situation with goals focused on comfort.  -Appreciate collaboration with  hospice team for symptom management in collaboration with cares for maximum comfort at end-of-life       Orders: - See above, otherwise, continue the rest of the current POC.       Electronically signed by:  Jhony Shields MD    Video-Visit Details  Type of service:  Video Visit  Video End Time (time video stopped): 10:29  Distant Location (provider location):  [facility] [facility]           Sincerely,        Jhony Shields MD
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
